# Patient Record
Sex: MALE | Race: WHITE
[De-identification: names, ages, dates, MRNs, and addresses within clinical notes are randomized per-mention and may not be internally consistent; named-entity substitution may affect disease eponyms.]

---

## 2021-04-05 ENCOUNTER — HOSPITAL ENCOUNTER (EMERGENCY)
Dept: HOSPITAL 75 - ER | Age: 66
Discharge: HOME | End: 2021-04-05
Payer: COMMERCIAL

## 2021-04-05 VITALS — WEIGHT: 296.96 LBS | HEIGHT: 78 IN | BODY MASS INDEX: 34.36 KG/M2

## 2021-04-05 VITALS — DIASTOLIC BLOOD PRESSURE: 76 MMHG | SYSTOLIC BLOOD PRESSURE: 124 MMHG

## 2021-04-05 DIAGNOSIS — M25.411: Primary | ICD-10-CM
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
## 2021-04-05 PROCEDURE — 99282 EMERGENCY DEPT VISIT SF MDM: CPT

## 2021-04-05 PROCEDURE — 73030 X-RAY EXAM OF SHOULDER: CPT

## 2021-04-05 NOTE — ED UPPER EXTREMITY
General


Chief Complaint:  Upper Extremity


Stated Complaint:  L SHOULDER INJ


Source:  patient


Exam Limitations:  no limitations





History of Present Illness


Date Seen by Provider:  2021


Time Seen by Provider:  20:03


Initial Comments


Mr. Escobar is a pleasant 65-year-old gentleman who just prior to arrival was 

playing Frisbee golf at [email or web address] and using his dominant right hand he did a under

conflict and immediately started having severe pain in his left shoulder just 

distal to the glenohumeral joint.  No prior injury there no fall and not on 

blood thinners.  He did not take anything for pain yet.  He rates it as a 7 out 

of 10 as long as he can split his left arm using his contralateral arm.





Allergies and Home Medications


Allergies


Coded Allergies:  


     No Known Drug Allergies (Unverified , 21)





Patient Home Medication List


Home Medication List Reviewed:  Yes





Review of Systems


Constitutional:  No chills, No diaphoresis


EENTM:  No ear discharge, No ear pain


Respiratory:  No cough, No short of breath


Cardiovascular:  No Hx of Intervention, No palpitations


Gastrointestinal:  No abdominal pain, No constipation, No diarrhea, No nausea


Genitourinary:  No discharge, No dysuria





All Other Systems Reviewed


Negative Unless Noted:  Yes





Past Medical-Social-Family Hx


Patient Social History


Alcohol Use:  Denies Use


Drug of Choice:  Denies





Physical Exam


Vital Signs





Vital Signs - First Documented








 21





 20:06


 


Temp 36.6


 


Pulse 86


 


Resp 18


 


B/P (MAP) 128/86 (100)


 


Pulse Ox 96


 


O2 Delivery Room Air





Capillary Refill :


Height, Weight, BMI


Height: '"


Weight: lbs. oz. kg;  BMI


Method:


General Appearance:  WD/WN, mild distress


HEENT:  PERRL/EOMI, pharynx normal


Neck:  non-tender, full range of motion, supple, normal inspection


Cardiovascular:  normal peripheral pulses, regular rate, rhythm, no edema


Respiratory:  no respiratory distress, no accessory muscle use


Shoulder:  bone tenderness (Proximal shaft just past the glenohumeral joint), 

limited ROM (Left), pain, swelling


Elbow/Forearm:  normal inspection, non-tender, no evidence of injury, normal 

ROM, Left


Wrist:  Yes normal inspection, Yes non-tender, Yes no evidence of injury, Yes 

normal ROM


Hand:  normal inspection, non-tender, no evidence of injury, normal ROM, Left


Neurologic/Psychiatric:  no motor/sensory deficits, alert, normal mood/affect, 

oriented x 3


Skin:  normal color, warm/dry





Procedures/Interventions





   Additional Procedures:  Arthrocentesis Aspirating


Progress


Discussed the risks, benefits and alternatives to aspirating his left shoulder 

and the patient elected to do it.  We used sterile gloves and sterile technique 

and Betadine.  After the Betadine had a few minutes to dry we then cleaned with 

chlorhexidine and then infiltrated the area with lidocaine.  Using a 18-gauge 

1/2 inch needle we put the needle into the joint space and were able to aspirate

just a small amount of serosanguineous secretions.  Unfortunately not enough to 

send the lab.  We infiltrated the area with 40 mg Depo-Medrol and half cc of 

Marcaine half percent strength.  Patient tolerated procedure well.  We will put 

a Band-Aid over the wound.





Progress/Results/Core Measures


Results/Orders


My Orders





Orders - HARDEEP COLEMAN


Shoulder, Left, 3 Views (21 20:07)


Methylprednisolone Acetate Inj (Depo-Med (21 21:00)


Lidocaine 1% Inj 20 Ml (Xylocaine 1% Inj (21 21:00)


Ketorolac Injection (Toradol Injection) (21 21:15)





Medications Given in ED





Current Medications








 Medications  Dose


 Ordered  Sig/Pb


 Route  Start Time


 Stop Time Status Last Admin


Dose Admin


 


 Lidocaine HCl  20 ml  ONCE  ONCE


 INJ  21 21:00


 21 21:01 DC 21 21:03


20 ML


 


 Methylprednisolone


 Acetate  40 mg  ONCE  ONCE


 IA  21 21:00


 21 21:01 DC 21 21:03


40 MG








Vital Signs/I&O











 21





 20:06


 


Temp 36.6


 


Pulse 86


 


Resp 18


 


B/P (MAP) 128/86 (100)


 


Pulse Ox 96


 


O2 Delivery Room Air











Progress


Progress Note :  


   Time:  20:09


Progress Note


Patient declined anything for pain.  We will get a plain film 3 view left 

shoulder x-ray





Diagnostic Imaging





   Diagonstic Imaging:  Xray


   Plain Films/CT/US/NM/MRI:  other (Left shoulder 3 views)


Comments


NAME:   YESENIA ESCOBAR


MED REC#:   [id number]


ACCOUNT#:   [id number]


PT STATUS:   REG ER


:   1955


PHYSICIAN:   HARDEEP COLEMAN MD


ADMIT DATE:   21/ER


                                   ***Draft***


Date of Exam:21





SHOULDER, LEFT, 3 VIEWS








INDICATION: Left shoulder pain.





COMPARISON: None available.





TECHNIQUE: 3 views of the left shoulder are obtained.





FINDINGS: No acute fracture or traumatic malalignment. There


appears to be some mineralization around the AC joint and


glenohumeral joint which could be due to cartilage calcification.


No osseous erosions. Joint spaces are fairly well-preserved.





IMPRESSION:


1. No acute osseous abnormality about the left shoulder.


2. Potential soft tissue mineralizations around the glenohumeral


and AC joints raise the possibility of CPPD arthropathy.





  Dictated on workstation # [id number]








Dict:   21


Trans:   21


[facility] [id number]





Interpreted by:     YESENIA GARY MD


Electronically signed by:


   Reviewed:  Reviewed by Me





Departure


Impression





   Primary Impression:  


   Left shoulder pain


   Qualified Codes:  M25.512 - Pain in left shoulder


Disposition:   HOME, SELF-CARE


Condition:  Stable





Departure-Patient Inst.


Decision time for Depature:  21:17


Referrals:  


ESTRELLITA RAVI MD (PCP/Family)


Primary Care Physician


Patient Instructions:  Passive Range of Motion Exercises, Neck and Shoulders, 

Shoulder Pain (DC)





Add. Discharge Instructions:  


We were not able to get any fluid out of the joint but the steroid should help 

even if it is a tendinitis of your left shoulder.  If not seeing improvement in 

1 week follow-up with your primary care doctor and discuss referral to 

orthopedic surgery, physical therapy etc.


Naproxen or ibuprofen as well as Tylenol for pain.


Ice 20 minutes on every 2 hours for the first 2 days as necessary for swelling 

and pain.


Keep the sling on for the next week until you are seen by your physician.  You 

may take the sling off to bathe.


It is reasonable also to take your arm out of the sling several times a day and 

do passive range of motion using your right arm.


Hydrocodone 1 tablet every 6 hours as necessary for severe breakthrough pain.


All discharge instructions reviewed with patient and/or family. Voiced 

understanding.


Scripts


Hydrocodone/Acetaminophen (Hydrocodone-Acetamin 5-325 mg) 1 Each Tablet


1 TAB PO Q6H PRN for PAIN-MODERATE (5-7), #12 TAB 0 Refills


   Prov: HARDEEP COLEMAN         21


Work/School Note:  Work Release Form   Date Seen in the Emergency Department:  

2021


   Return to Work:  2021


   Restrictions:  Need Release from Doctor


   Other Restrictions Listed Below:  Left arm in sling and no lifting, pushing 

or pulling until 2021.





Copy


Copies To 1:   ESTRELLITA RAVI MD, TITUS J                  2021 20:10

## 2021-04-05 NOTE — DIAGNOSTIC IMAGING REPORT
INDICATION: Left shoulder pain.



COMPARISON: None available.



TECHNIQUE: 3 views of the left shoulder are obtained.



FINDINGS: No acute fracture or traumatic malalignment. There

appears to be some mineralization around the AC joint and

glenohumeral joint which could be due to cartilage calcification.

No osseous erosions. Joint spaces are fairly well-preserved.



IMPRESSION:

1. No acute osseous abnormality about the left shoulder.

2. Potential soft tissue mineralizations around the glenohumeral

and AC joints raise the possibility of CPPD arthropathy.



Dictated by: 



  Dictated on workstation # [id number]

## 2022-05-06 ENCOUNTER — HOSPITAL ENCOUNTER (EMERGENCY)
Dept: HOSPITAL 75 - ER | Age: 67
Discharge: HOME | End: 2022-05-06
Payer: MEDICARE

## 2022-05-06 VITALS — BODY MASS INDEX: 34.13 KG/M2 | HEIGHT: 77.95 IN | WEIGHT: 294.98 LBS

## 2022-05-06 VITALS — SYSTOLIC BLOOD PRESSURE: 122 MMHG | DIASTOLIC BLOOD PRESSURE: 83 MMHG

## 2022-05-06 DIAGNOSIS — N45.3: Primary | ICD-10-CM

## 2022-05-06 LAB
APTT PPP: YELLOW S
BACTERIA #/AREA URNS HPF: (no result) /HPF
BASOPHILS # BLD AUTO: 0 10^3/UL (ref 0–0.1)
BASOPHILS NFR BLD AUTO: 0 % (ref 0–10)
BILIRUB UR QL STRIP: NEGATIVE
BUN/CREAT SERPL: 13
CALCIUM SERPL-MCNC: 9.4 MG/DL (ref 8.5–10.1)
CHLORIDE SERPL-SCNC: 103 MMOL/L (ref 98–107)
CO2 SERPL-SCNC: 22 MMOL/L (ref 21–32)
CREAT SERPL-MCNC: 1.21 MG/DL (ref 0.6–1.3)
EOSINOPHIL # BLD AUTO: 0.1 10^3/UL (ref 0–0.3)
EOSINOPHIL NFR BLD AUTO: 1 % (ref 0–10)
EOSINOPHIL NFR BLD MANUAL: 1 %
FIBRINOGEN PPP-MCNC: (no result) MG/DL
GFR SERPLBLD BASED ON 1.73 SQ M-ARVRAT: 66 ML/MIN
GLUCOSE SERPL-MCNC: 216 MG/DL (ref 70–105)
GLUCOSE UR STRIP-MCNC: NEGATIVE MG/DL
HCT VFR BLD CALC: 42 % (ref 40–54)
HGB BLD-MCNC: 13.5 G/DL (ref 13.3–17.7)
KETONES UR QL STRIP: NEGATIVE
LEUKOCYTE ESTERASE UR QL STRIP: (no result)
LYMPHOCYTES # BLD AUTO: 1.1 10^3/UL (ref 1–4)
LYMPHOCYTES NFR BLD AUTO: 7 % (ref 12–44)
MANUAL DIFFERENTIAL PERFORMED BLD QL: YES
MCH RBC QN AUTO: 29 PG (ref 25–34)
MCHC RBC AUTO-ENTMCNC: 33 G/DL (ref 32–36)
MCV RBC AUTO: 90 FL (ref 80–99)
MONOCYTES # BLD AUTO: 1 10^3/UL (ref 0–1)
MONOCYTES NFR BLD AUTO: 6 % (ref 0–12)
MONOCYTES NFR BLD: 8 %
NEUTROPHILS # BLD AUTO: 13.1 10^3/UL (ref 1.8–7.8)
NEUTROPHILS NFR BLD AUTO: 85 % (ref 42–75)
NEUTS BAND NFR BLD MANUAL: 89 %
NITRITE UR QL STRIP: NEGATIVE
PH UR STRIP: 5.5 [PH] (ref 5–9)
PLATELET # BLD: 216 10^3/UL (ref 130–400)
PMV BLD AUTO: 10.7 FL (ref 9–12.2)
POTASSIUM SERPL-SCNC: 4 MMOL/L (ref 3.6–5)
PROT UR QL STRIP: NEGATIVE
RBC #/AREA URNS HPF: (no result) /HPF
RBC MORPH BLD: NORMAL
SODIUM SERPL-SCNC: 140 MMOL/L (ref 135–145)
SP GR UR STRIP: 1.02 (ref 1.02–1.02)
SQUAMOUS #/AREA URNS HPF: (no result) /HPF
VARIANT LYMPHS NFR BLD MANUAL: 9 %
WBC # BLD AUTO: 15.3 10^3/UL (ref 4.3–11)

## 2022-05-06 PROCEDURE — 87186 SC STD MICRODIL/AGAR DIL: CPT

## 2022-05-06 PROCEDURE — 87088 URINE BACTERIA CULTURE: CPT

## 2022-05-06 PROCEDURE — 80048 BASIC METABOLIC PNL TOTAL CA: CPT

## 2022-05-06 PROCEDURE — 87077 CULTURE AEROBIC IDENTIFY: CPT

## 2022-05-06 PROCEDURE — 85007 BL SMEAR W/DIFF WBC COUNT: CPT

## 2022-05-06 PROCEDURE — 36415 COLL VENOUS BLD VENIPUNCTURE: CPT

## 2022-05-06 PROCEDURE — 76870 US EXAM SCROTUM: CPT

## 2022-05-06 PROCEDURE — 81000 URINALYSIS NONAUTO W/SCOPE: CPT

## 2022-05-06 PROCEDURE — 85027 COMPLETE CBC AUTOMATED: CPT

## 2022-05-06 NOTE — DIAGNOSTIC IMAGING REPORT
Clinical indications: Patient with red painful swollen right

testicle.



Exam: Ultrasound of the scrotum with multiple real-time grayscale

images were obtained in various projections. Additional spectral

analysis and color Doppler duplex images were also obtained. 



Comparison: None.



Findings:



Right testicle: 

Right testis measures 5.4 cm x 3.0 cm x 3.6 cm. The right testis

is homogeneous in echogenicity with no focal mass present.  There

is enlarged and hypervascular right epididymis region which may

be related to epididymitis. There is also slight increased

Doppler flow involving the right testicle in comparison to the

left testicle which may be related to orchitis. Otherwise right

testicle has normal spectral Doppler waveform. There is no

varicocele. Small right hydrocele is noted. There is no

testicular torsion.



Left testicle: 

Left testis measures 5.5 cm x 2.3 cm x 3.2 cm. Left testis is

homogeneous in echogenicity, with no focal mass present. Blood

flow is present in left testis on color flow imaging and has

normal appearing spectral Doppler waveform. Left epididymis is

unremarkable. There is no varicocele. There is no hydrocele.

There is no testicular torsion.



Impression:  

1: Right testicular findings are concerning for

orchitis-epididymitis. There is a small right hydrocele.

2: Left testicle is unremarkable. 



Dictated by: 



  Dictated on workstation # [id number]

## 2022-05-06 NOTE — ED GU-MALE
General


Chief Complaint:   - Reproductive


Stated Complaint:  R TESTICLE SWOLLEN


Nursing Triage Note:  


PRESENTS W/ C/O RIGHT TESTICULAR PAIN THAT STARTED YESTERDAY MORNING.  nOW 


HAVING INABILITY TO CONTROL URINE.  hAS URGENCY AND FREQUENCY.  tESTICULE NOW 


MORE SWOLLEN.


Source:  patient


Exam Limitations:  no limitations





History of Present Illness


Date Seen by Provider:  May 6, 2022


Time Seen by Provider:  19:57


Initial Comments


Patient presents ER by private conveyance with significant other chief complaint

yesterday morning approximately 36 hours ago patient started experiencing some 

right testicular swelling and pain which was mild to moderate at first and is 

now severe.  He takes Percocet for his back pain and he took a tablet that 

before coming in with no significant relief of pain.  He has dysuria which is 

worse in the morning and gets better throughout the day for the past 2 days.  No

history of  surgery or frequent UTIs.  No history of inguinal hernias





Allergies and Home Medications


Allergies


Coded Allergies:  


     No Known Drug Allergies (Unverified , 21)





Patient Home Medication List


Home Medication List Reviewed:  Yes


Hydrocodone/Acetaminophen (Hydrocodone-Acetamin 5-325 mg) 1 Each Tablet, 1 TAB 

PO Q6H PRN for PAIN-MODERATE (5-7)


   Prescribed by: HARDEEP COLEMAN on 21





Review of Systems


Review of Systems


Constitutional:  No chills, No fever, No malaise


EENTM:  No ear discharge, No ear pain


Respiratory:  No cough, No short of breath


Cardiovascular:  No chest pain, No edema


Gastrointestinal:  No abdominal pain, No nausea


Genitourinary:  denies discharge, denies dysuria


Musculoskeletal:  No back pain, No joint pain





All Other Systemes Reviewed


Negative Unless Noted:  Yes





Past Medical-Social-Family Hx


Patient Social History


Tobacco Use?:  No


Use of E-Cig and/or Vaping dev:  No


Substance use?:  Yes


Substance type:  Marijuana


Alcohol Use?:  No





Seasonal Allergies


Seasonal Allergies:  No





Past Medical History


Surgeries:  Yes


Orthopedic


Respiratory:  No


Cardiac:  No


Neurological:  No


Genitourinary:  No


Gastrointestinal:  No


Musculoskeletal:  Yes (ROTATOR CUFF REPLAIR & AC JOINT. BILAT KNEE REPLACEMENT )


Endocrine:  Yes


Diabetes, Non-Insulin dep


HEENT:  No


Cancer:  No


Psychosocial:  No


Integumentary:  No





Physical Exam


Vital Signs





Vital Signs - First Documented








 22





 19:48


 


Temp 36.7


 


Pulse 112


 


Resp 22


 


B/P (MAP) 122/83 (96)


 


Pulse Ox 98


 


O2 Delivery Room Air





Capillary Refill : Less Than 3 Seconds


Height, Weight, BMI


Height: '"


Weight: lbs. oz. kg; 34.00 BMI


Method:


General Appearance:  WD/WN, mild distress


HEENT:  PERRL/EOMI, pharynx normal


Neck:  full range of motion, normal inspection


Cardiovascular:  normal peripheral pulses, regular rate, rhythm


Respiratory:  lungs clear, normal breath sounds, no respiratory distress, no 

accessory muscle use


Gastrointestinal:  normal bowel sounds, non tender, soft


Genital/Rectal:  other (No lesions on the scrotum or penis.  No exudate or 

drainage from the urethral meatus.  No masses palpable in the penis.  The 

scrotum contains an erythematous skin on the right side with a testicle on the 

right that is approximately 3-4 times the size of his left testicle and is 

tender to palpation without hard, firm mass.  Inguinal canals free of any 

abdominal content)


Extremities:  non-tender, normal inspection


Neurologic/Psychiatric:  alert, normal mood/affect, oriented x 3





Progress/Results/Core Measures


Suspected Sepsis


SIRS


Temperature: 


Pulse: 112 


Respiratory Rate: 22


 


Laboratory Tests


22 21:20: White Blood Count 15.3H


Blood Pressure 122 /83 


Mean: 96


 











Laboratory Tests


22 21:20: 


Creatinine 1.21, Platelet Count 216





Results/Orders


Lab Results





Laboratory Tests








Test


 22


20:52 22


21:20 Range/Units


 


 


Urine Color YELLOW    


 


Urine Clarity SL CLOUDY    


 


Urine pH 5.5   5-9  


 


Urine Specific Gravity 1.025 H  1.016-1.022  


 


Urine Protein NEGATIVE   NEGATIVE  


 


Urine Glucose (UA) NEGATIVE   NEGATIVE  


 


Urine Ketones NEGATIVE   NEGATIVE  


 


Urine Nitrite NEGATIVE   NEGATIVE  


 


Urine Bilirubin NEGATIVE   NEGATIVE  


 


Urine Urobilinogen 0.2   < = 1.0  MG/DL


 


Urine Leukocyte Esterase 2+ H  NEGATIVE  


 


Urine RBC (Auto) TRACE-I H  NEGATIVE  


 


Urine RBC 5-10 H   /HPF


 


Urine WBC 10-25 H   /HPF


 


Urine Squamous Epithelial


Cells 0-2 


 


  /HPF





 


Urine Crystals NONE    /LPF


 


Urine Bacteria FEW H   /HPF


 


Urine Casts NONE    /LPF


 


Urine Mucus SMALL H   /LPF


 


Urine Culture Indicated YES    


 


White Blood Count


 


 15.3 H


 4.3-11.0


10^3/uL


 


Red Blood Count


 


 4.61 


 4.30-5.52


10^6/uL


 


Hemoglobin  13.5  13.3-17.7  g/dL


 


Hematocrit  42  40-54  %


 


Mean Corpuscular Volume  90  80-99  fL


 


Mean Corpuscular Hemoglobin  29  25-34  pg


 


Mean Corpuscular Hemoglobin


Concent 


 33 


 32-36  g/dL





 


Red Cell Distribution Width  13.7  10.0-14.5  %


 


Platelet Count


 


 216 


 130-400


10^3/uL


 


Mean Platelet Volume  10.7  9.0-12.2  fL


 


Immature Granulocyte % (Auto)  0   %


 


Neutrophils (%) (Auto)  85 H 42-75  %


 


Lymphocytes (%) (Auto)  7 L 12-44  %


 


Monocytes (%) (Auto)  6  0-12  %


 


Eosinophils (%) (Auto)  1  0-10  %


 


Basophils (%) (Auto)  0  0-10  %


 


Neutrophils # (Auto)


 


 13.1 H


 1.8-7.8


10^3/uL


 


Lymphocytes # (Auto)


 


 1.1 


 1.0-4.0


10^3/uL


 


Monocytes # (Auto)


 


 1.0 


 0.0-1.0


10^3/uL


 


Eosinophils # (Auto)


 


 0.1 


 0.0-0.3


10^3/uL


 


Basophils # (Auto)


 


 0.0 


 0.0-0.1


10^3/uL


 


Immature Granulocyte # (Auto)


 


 0.1 


 0.0-0.1


10^3/uL


 


Neutrophils % (Manual)  89   %


 


Lymphocytes % (Manual)  9   %


 


Monocytes % (Manual)  8   %


 


Eosinophils % (Manual)  1   %


 


Blood Morphology Comment  NORMAL   


 


Sodium Level  140  135-145  MMOL/L


 


Potassium Level  4.0  3.6-5.0  MMOL/L


 


Chloride Level  103    MMOL/L


 


Carbon Dioxide Level  22  21-32  MMOL/L


 


Anion Gap  15 H 5-14  MMOL/L


 


Blood Urea Nitrogen  16  7-18  MG/DL


 


Creatinine


 


 1.21 


 0.60-1.30


MG/DL


 


Estimat Glomerular Filtration


Rate 


 66 


  





 


BUN/Creatinine Ratio  13   


 


Glucose Level  216 H   MG/DL


 


Calcium Level  9.4  8.5-10.1  MG/DL








My Orders





Orders - HARDEEP COLEMAN


Fentanyl  Inj (Sublimaze Injection) (22 20:15)


Ua Culture If Indicated (22 20:09)


Cbc With Automated Diff (22 20:09)


Basic Metabolic Panel (22 20:09)


Us Scrotum (Testicle) [id number] (22 20:14)


Ed Iv/Invasive Line Start (22 20:14)


Ceftriaxone 1 Gm Pre-Mix (Rocephin 1 Gm (22 21:00)


Urine Culture (22 20:52)


Manual Differential (22 21:20)





Medications Given in ED





Current Medications








 Medications  Dose


 Ordered  Sig/Pb


 Route  Start Time


 Stop Time Status Last Admin


Dose Admin


 


 Ceftriaxone


 Sodium/Dextrose  50 ml @ 


 100 mls/hr  ONCE  ONCE


 IV  22 21:00


 22 21:29 DC 22 21:27


100 MLS/HR


 


 Fentanyl Citrate  75 mcg  ONCE  ONCE


 IV  22 20:15


 22 20:16 DC 22 21:24


75 MCG








Vital Signs/I&O











 22





 19:48


 


Temp 36.7


 


Pulse 112


 


Resp 22


 


B/P (MAP) 122/83 (96)


 


Pulse Ox 98


 


O2 Delivery Room Air





Capillary Refill : Less Than 3 Seconds








Blood Pressure Mean:                    96








Progress Note :  


   Time:  20:13


Progress Note


Suspect a UTI that is turned into an orchitis/epididymitis.  Pains been going on

for significant amount of time so he given some fentanyl and have asked 

ultrasound to come in which they have agreed to scan him.  Basic labs.





Diagnostic Imaging





   Diagonstic Imaging:  Ultrasound


   Plain Films/CT/US/NM/MRI:  other (Scrotum)


Comments


Orchitis and epididymitis of the right testicle with a small amount of 

physiologic fluid seen.  No masses.  Good blood flow.  No torsion.


                 [facility] [place], [place]





NAME:   YESENIA BHAKTA


[facility] REC#:   [id number]


ACCOUNT#:   [id number]


PT STATUS:   REG ER


:   1955


PHYSICIAN:   HARDEEP COLEMAN MD


ADMIT DATE:   22/ER


                                   ***Draft***


Date of Exam:22





US SCROTUM (Testicle) [id number]








Clinical indications: Patient with red painful swollen right


testicle.





Exam: Ultrasound of the scrotum with multiple real-time grayscale


images were obtained in various projections. Additional spectral


analysis and color Doppler duplex images were also obtained. 





Comparison: None.





Findings:





Right testicle: 


Right testis measures 5.4 cm x 3.0 cm x 3.6 cm. The right testis


is homogeneous in echogenicity with no focal mass present.  There


is enlarged and hypervascular right epididymis region which may


be related to epididymitis. There is also slight increased


Doppler flow involving the right testicle in comparison to the


left testicle which may be related to orchitis. Otherwise right


testicle has normal spectral Doppler waveform. There is no


varicocele. Small right hydrocele is noted. There is no


testicular torsion.





Left testicle: 


Left testis measures 5.5 cm x 2.3 cm x 3.2 cm. Left testis is


homogeneous in echogenicity, with no focal mass present. Blood


flow is present in left testis on color flow imaging and has


normal appearing spectral Doppler waveform. Left epididymis is


unremarkable. There is no varicocele. There is no hydrocele.


There is no testicular torsion.





Impression:  


1: Right testicular findings are concerning for


orchitis-epididymitis. There is a small right hydrocele.


2: Left testicle is unremarkable. 





  Dictated on workstation # [id number]








Dict:   22


Trans:   22


[facility] [id number]





Interpreted by:     KRZYSZTOF ALEXANDRA MD


Electronically signed by:


   Reviewed:  Reviewed by Me





Departure


Impression





   Primary Impression:  


   Orchitis and epididymitis


Disposition:  01 HOME, SELF-CARE


Condition:  Stable





Departure-Patient Inst.


Decision time for Depature:  22:11


Referrals:  


ESTRELLITA RAVI MD (PCP/Family)


Primary Care Physician








TAWIL,ELIAS A MD


Patient Instructions:  Epididymitis (DC)





Add. Discharge Instructions:  


This is an infection of the testicle and surrounding tissue.  It is typically 

easily treated with antibiotics.


Doxycycline 1 capsule twice a day for the next 10 days.  You can start this 

tomorrow, 2022.


Percocet 1 to 2 tablets every 4 hours as needed for pain.


You might want to take something to keep your bowels regular such as MiraLAX, 

Colace etc.


Pain should start improving in 3 to 4 days of antibiotics.


Follow-up with the urologist, Dr. Tawil for help with managing your symptoms.





All discharge instructions reviewed with patient and/or family. Voiced u

nderstanding.


Scripts


Ondansetron (Ondansetron Odt) 4 Mg Tab.rapdis


4 MG PO Q6H PRN for NAUSEA/VOMITING, #12 TAB 0 Refills


   Prov: HARDEEP COLEMAN         22 


Doxycycline Hyclate (Doxycycline Hyclate) 100 Mg Tablet


100 MG PO BID for 10 Days, #20 TAB 0 Refills


   Prov: HARDEEP COLEMAN         22 


Oxycodone HCl/Acetaminophen (Percocet 5-325 mg Tablet) 5 Mg-325 Mg Tablet


1-2 TAB PO Q4H PRN for PAIN-MODERATE MDD 6 for 5 Days, #30 TAB 0 Refills


   Prov: HARDEEP COLEMAN         22





Copy


Copies To 1:   TAWIL,ELIAS A MD WELLER,TITUS J                  May 6, 2022 20:14

## 2022-05-09 ENCOUNTER — HOSPITAL ENCOUNTER (INPATIENT)
Dept: HOSPITAL 75 - ER | Age: 67
LOS: 2 days | Discharge: HOME | DRG: 728 | End: 2022-05-11
Attending: FAMILY MEDICINE | Admitting: FAMILY MEDICINE
Payer: MEDICARE

## 2022-05-09 VITALS — HEIGHT: 77.17 IN | BODY MASS INDEX: 34.93 KG/M2 | WEIGHT: 295.86 LBS

## 2022-05-09 VITALS — DIASTOLIC BLOOD PRESSURE: 82 MMHG | SYSTOLIC BLOOD PRESSURE: 133 MMHG

## 2022-05-09 VITALS — SYSTOLIC BLOOD PRESSURE: 148 MMHG | DIASTOLIC BLOOD PRESSURE: 94 MMHG

## 2022-05-09 DIAGNOSIS — M54.9: ICD-10-CM

## 2022-05-09 DIAGNOSIS — E66.9: ICD-10-CM

## 2022-05-09 DIAGNOSIS — E11.9: ICD-10-CM

## 2022-05-09 DIAGNOSIS — G89.29: ICD-10-CM

## 2022-05-09 DIAGNOSIS — N45.3: Primary | ICD-10-CM

## 2022-05-09 LAB
ALBUMIN SERPL-MCNC: 4 GM/DL (ref 3.2–4.5)
ALP SERPL-CCNC: 71 U/L (ref 40–136)
ALT SERPL-CCNC: 36 U/L (ref 0–55)
APTT BLD: 37 SEC (ref 24–35)
APTT PPP: YELLOW S
BACTERIA #/AREA URNS HPF: NEGATIVE /HPF
BASOPHILS # BLD AUTO: 0 10^3/UL (ref 0–0.1)
BASOPHILS NFR BLD AUTO: 0 % (ref 0–10)
BILIRUB SERPL-MCNC: 1.7 MG/DL (ref 0.1–1)
BILIRUB UR QL STRIP: NEGATIVE
BUN/CREAT SERPL: 13
CALCIUM SERPL-MCNC: 10 MG/DL (ref 8.5–10.1)
CHLORIDE SERPL-SCNC: 102 MMOL/L (ref 98–107)
CO2 SERPL-SCNC: 21 MMOL/L (ref 21–32)
CREAT SERPL-MCNC: 0.91 MG/DL (ref 0.6–1.3)
EOSINOPHIL # BLD AUTO: 0.3 10^3/UL (ref 0–0.3)
EOSINOPHIL NFR BLD AUTO: 3 % (ref 0–10)
FIBRINOGEN PPP-MCNC: (no result) MG/DL
GFR SERPLBLD BASED ON 1.73 SQ M-ARVRAT: 94 ML/MIN
GLUCOSE SERPL-MCNC: 162 MG/DL (ref 70–105)
GLUCOSE UR STRIP-MCNC: NEGATIVE MG/DL
HCT VFR BLD CALC: 41 % (ref 40–54)
HGB BLD-MCNC: 13.6 G/DL (ref 13.3–17.7)
INR PPP: 1.1 (ref 0.8–1.4)
KETONES UR QL STRIP: (no result)
LEUKOCYTE ESTERASE UR QL STRIP: (no result)
LYMPHOCYTES # BLD AUTO: 1.9 X 10^3 (ref 1–4)
LYMPHOCYTES NFR BLD AUTO: 15 % (ref 12–44)
MANUAL DIFFERENTIAL PERFORMED BLD QL: NO
MCH RBC QN AUTO: 29 PG (ref 25–34)
MCHC RBC AUTO-ENTMCNC: 33 G/DL (ref 32–36)
MCV RBC AUTO: 89 FL (ref 80–99)
MONOCYTES # BLD AUTO: 0.9 X 10^3 (ref 0–1)
MONOCYTES NFR BLD AUTO: 8 % (ref 0–12)
NEUTROPHILS # BLD AUTO: 9.1 X 10^3 (ref 1.8–7.8)
NEUTROPHILS NFR BLD AUTO: 74 % (ref 42–75)
NITRITE UR QL STRIP: NEGATIVE
PH UR STRIP: 5.5 [PH] (ref 5–9)
PLATELET # BLD: 289 10^3/UL (ref 130–400)
PMV BLD AUTO: 10.8 FL (ref 9–12.2)
POTASSIUM SERPL-SCNC: 4.1 MMOL/L (ref 3.6–5)
PROT SERPL-MCNC: 8.2 GM/DL (ref 6.4–8.2)
PROT UR QL STRIP: NEGATIVE
PROTHROMBIN TIME: 14.7 SEC (ref 12.2–14.7)
RBC #/AREA URNS HPF: (no result) /HPF
SODIUM SERPL-SCNC: 138 MMOL/L (ref 135–145)
SP GR UR STRIP: 1.02 (ref 1.02–1.02)
SQUAMOUS #/AREA URNS HPF: (no result) /HPF
WBC # BLD AUTO: 12.2 10^3/UL (ref 4.3–11)

## 2022-05-09 PROCEDURE — 96365 THER/PROPH/DIAG IV INF INIT: CPT

## 2022-05-09 PROCEDURE — 96375 TX/PRO/DX INJ NEW DRUG ADDON: CPT

## 2022-05-09 PROCEDURE — 80048 BASIC METABOLIC PNL TOTAL CA: CPT

## 2022-05-09 PROCEDURE — 76705 ECHO EXAM OF ABDOMEN: CPT

## 2022-05-09 PROCEDURE — 87591 N.GONORRHOEAE DNA AMP PROB: CPT

## 2022-05-09 PROCEDURE — 85610 PROTHROMBIN TIME: CPT

## 2022-05-09 PROCEDURE — 87040 BLOOD CULTURE FOR BACTERIA: CPT

## 2022-05-09 PROCEDURE — 96361 HYDRATE IV INFUSION ADD-ON: CPT

## 2022-05-09 PROCEDURE — 81000 URINALYSIS NONAUTO W/SCOPE: CPT

## 2022-05-09 PROCEDURE — 80053 COMPREHEN METABOLIC PANEL: CPT

## 2022-05-09 PROCEDURE — 87088 URINE BACTERIA CULTURE: CPT

## 2022-05-09 PROCEDURE — 85025 COMPLETE CBC W/AUTO DIFF WBC: CPT

## 2022-05-09 PROCEDURE — 83036 HEMOGLOBIN GLYCOSYLATED A1C: CPT

## 2022-05-09 PROCEDURE — 76870 US EXAM SCROTUM: CPT

## 2022-05-09 PROCEDURE — 87491 CHLMYD TRACH DNA AMP PROBE: CPT

## 2022-05-09 PROCEDURE — 85730 THROMBOPLASTIN TIME PARTIAL: CPT

## 2022-05-09 PROCEDURE — 86780 TREPONEMA PALLIDUM: CPT

## 2022-05-09 PROCEDURE — 80202 ASSAY OF VANCOMYCIN: CPT

## 2022-05-09 PROCEDURE — 83605 ASSAY OF LACTIC ACID: CPT

## 2022-05-09 PROCEDURE — 36415 COLL VENOUS BLD VENIPUNCTURE: CPT

## 2022-05-09 RX ADMIN — DOCUSATE SODIUM SCH MG: 100 CAPSULE ORAL at 21:25

## 2022-05-09 RX ADMIN — SODIUM CHLORIDE SCH MLS/HR: 900 INJECTION, SOLUTION INTRAVENOUS at 17:54

## 2022-05-09 RX ADMIN — SODIUM CHLORIDE SCH MLS/HR: 900 INJECTION INTRAVENOUS at 22:31

## 2022-05-09 RX ADMIN — ENOXAPARIN SODIUM SCH MG: 100 INJECTION SUBCUTANEOUS at 18:06

## 2022-05-09 RX ADMIN — POLYETHYLENE GLYCOL (3350) SCH GM: 17 POWDER, FOR SOLUTION ORAL at 18:48

## 2022-05-09 NOTE — PROGRESS NOTE
Subjective


Subjective/Events-last exam


66 yo male with a past medical hx of diabetes and recent spinal surgery 

presented for right testicular swelling. Pt reports a week and a half ago he had

burning with urination in the morning that improve throughout the day. Then on 

Friday he noticed right testicular swelling and pain. Pt states he came to the E

D and was given Rocephin and Doxycycline which he was compliant with taking. Pt 

was also prescribed opioid pain medication for a recent spinal surgery that he 

was taking for the pain. Pt reports the testicular swelling and pain worsened so

he reported back to the ED today. Pt states he has had some nausea and 

constipation since starting the opioids. Pt reports having a fever of 101 

yesterday, but this morning it was 96. Pt denies SOA, chest pain, palpitations, 

dysuria, urinary frequency, back pain or LE edema.


Review of Systems


General:  No Chills, No Fatigue


HEENT:  No Head Aches, No Visual Changes


Pulmonary:  No Dyspnea; Cough


Cardiovascular:  No: Chest Pain, Palpitations


Gastrointestinal:  Nausea, Constipation; No: Vomiting, Diarrhea


Genitourinary:  No Dysuria


Musculoskeletal:  No: neck pain, shoulder pain, back pain


Neurological:  No: Weakness, Numbness





Focused Exam


Lactate Level


5/9/22 15:22: Lactic Acid Level 1.77


Lactic Acid Level





Laboratory Tests








Test


 5/9/22


15:22


 


Lactic Acid Level


 1.77 MMOL/L


(0.50-2.00)











Objective


Exam


Last Set of Vital Signs





Vital Signs








  Date Time  Temp Pulse Resp B/P (MAP) Pulse Ox O2 Delivery O2 Flow Rate FiO2


 


5/9/22 15:00 36.8 89 20 127/77 (94)    





Capillary Refill :


General:  Alert, Oriented X3, Cooperative


HEENT:  Atraumatic, PERRLA


Lungs:  Clear to Auscultation


Heart:  Regular Rate, Normal S1, Normal S2


Abdomen:  Normal Bowel Sounds, Soft


Extremities:  No Clubbing, No Cyanosis, No Edema


Skin:  Other (erythema and edema of right testicle)


Neuro:  Normal Speech, Sensation Intact


Psych/Mental Status:  Mental Status NL, Mood NL





Results/Procedures


Lab


Laboratory Tests


5/9/22 15:00: 


White Blood Count 12.2H, Red Blood Count 4.63, Hemoglobin 13.6, Hematocrit 41, 

Mean Corpuscular Volume 89, Mean Corpuscular Hemoglobin 29, Mean Corpuscular 

Hemoglobin Concent 33, Red Cell Distribution Width 13.5, Platelet Count 289, 

Mean Platelet Volume 10.8, Immature Granulocyte % (Auto) 0, Neutrophils (%) 

(Auto) 74, Lymphocytes (%) (Auto) 15, Monocytes (%) (Auto) 8, Eosinophils (%) 

(Auto) 3, Basophils (%) (Auto) 0, Neutrophils # (Auto) 9.1H, Lymphocytes # 

(Auto) 1.9, Monocytes # (Auto) 0.9, Eosinophils # (Auto) 0.3, Basophils # (Auto)

0.0, Immature Granulocyte # (Auto) 0.0, Prothrombin Time 14.7, INR Comment 1.1, 

Activated Partial Thromboplast Time 37H, Sodium Level 138, Potassium Level 4.1, 

Chloride Level 102, Carbon Dioxide Level 21, Anion Gap 15H, Blood Urea Nitrogen 

12, Creatinine 0.91, Estimat Glomerular Filtration Rate 94, BUN/Creatinine Ratio

13, Glucose Level 162H, Calcium Level 10.0, Corrected Calcium 10.0, Total 

Bilirubin 1.7H, Aspartate Amino Transf (AST/SGOT) 42H, Alanine Aminotransferase 

(ALT/SGPT) 36, Alkaline Phosphatase 71, Total Protein 8.2, Albumin 4.0


5/9/22 15:22: Lactic Acid Level 1.77





Assessment/Plan


Assessment/Plan


Admission Dx


Right orchitis


Assessment & Plan


Right orchitis


   -Failed outpatient antibiotic treatment with Rocephin and doxycycline


   -Urology consulted


   -Scrotal U/S pending


   -Cefepime and Vanc started


   -Dilaudid for pain management


   -Chlamydia and Gonorrhea ordered


Diabetes


   -SSI


Recent hx of spinal surgery with opioid use





DVT prophylaxis with SCDs


Diet- Regular











TOBI GAMBINO MED STUDENT     May 9, 2022 17:08

## 2022-05-09 NOTE — DIAGNOSTIC IMAGING REPORT
PROCEDURE: US Scrotum.



TECHNIQUE: Multiple real-time grayscale images were obtained over

the scrotum in various projections bilaterally.



INDICATION: Swelling, pain.



COMPARISON: May 06, 2022.



FINDINGS: The right testicle is mildly enlarged measuring 5.5 x

3.8 x 2.9 cm. It maintains a homogeneous echotexture without

intratesticular mass. Vascular flow throughout the right testicle

has increased throughout.



The right epididymis is prominent with diffusely increased

vascularity throughout.



Small right-sided hydrocele.



The left testicle measures 3.9 x 2.4 x 1.8 cm. It maintains a

homogeneous echotexture without discrete intratesticular mass.

Vascular flow is noted within the left testicle.



Tiny anechoic cyst is noted within the left epididymal head.

Otherwise, the left epididymis is unremarkable.



No significant left-sided hydrocele.



IMPRESSION: Findings consistent with right-sided

epididymo-orchitis.



Small right-sided hydrocele.



Small left epididymal head cyst.



Dictated by: 



  Dictated on workstation # [id number]

## 2022-05-09 NOTE — ED GU-MALE
General


Chief Complaint:   - Reproductive


Stated Complaint:  R TESTICLE SWOLLEN


Source:  patient, spouse


Exam Limitations:  no limitations





History of Present Illness


Date Seen by Provider:  May 9, 2022


Time Seen by Provider:  14:55


Initial Comments


Patient to the ER by private conveyance from home with his spouse and chief 

complaint of swollen, painful right testicle.  He was seen Friday night, 3 days 

ago for the same symptoms.  He was diagnosed with orchitis, given Rocephin and 

put out on doxycycline which he states he has been taking.  He typically takes 

Percocet tens 2-3 times a day for his chronic back pain and was sent home with 

more Percocets as well as nausea medicine.  He says his nausea starting to come 

back now and he rates his pain as a 10 out of 10.  He had some subjective fevers

and chills last night which were new.  He feels like the swelling has 

significantly gotten worse rather than better and called the urologist today who

told him to come to the ER to be checked out.





Allergies and Home Medications


Allergies


Coded Allergies:  


     morphine (Verified  Allergy, Unknown, HIVES, 22)





Patient Home Medication List


Home Medication List Reviewed:  Yes


Doxycycline Hyclate (Doxycycline Hyclate) 100 Mg Tablet, 100 MG PO BID


   Prescribed by: HARDEEP COLEMAN on 22


Hydrocodone/Acetaminophen (Hydrocodone-Acetamin 5-325 mg) 1 Each Tablet, 1 TAB 

PO Q6H PRN for PAIN-MODERATE (5-7)


   Prescribed by: HARDEEP COLEMAN on 21


Ondansetron (Ondansetron Odt) 4 Mg Tab.rapdis, 4 MG PO Q6H PRN for 

NAUSEA/VOMITING


   Prescribed by: HARDEEP COLEMAN on 22


Oxycodone HCl/Acetaminophen (Percocet 5-325 mg Tablet) 5 Mg-325 Mg Tablet, 1-2 

TAB PO Q4H PRN for PAIN-MODERATE


   Prescribed by: HARDEEP COLEMAN on 22





Review of Systems


Review of Systems


Constitutional:  No chills, No diaphoresis


EENTM:  No ear discharge, No ear pain


Respiratory:  No cough, No short of breath


Cardiovascular:  No chest pain, No edema


Gastrointestinal:  No abdominal pain, No nausea, No vomiting


Genitourinary:  denies discharge, denies dysuria


Musculoskeletal:  No back pain, No joint pain





All Other Systemes Reviewed


Negative Unless Noted:  Yes





Past Medical-Social-Family Hx


Patient Social History


Tobacco Use?:  No


Use of E-Cig and/or Vaping dev:  No


Substance use?:  Yes


Substance type:  Marijuana


Substance frequency:  Several times a month


Alcohol Use?:  No


Pt feels they are or have been:  No





Seasonal Allergies


Seasonal Allergies:  No





Past Medical History


Surgery/Hospitalization HX:  


DM


Surgeries:  Yes


Orthopedic


Respiratory:  No


Cardiac:  No


Neurological:  No


Genitourinary:  No


Gastrointestinal:  No


Musculoskeletal:  Yes (ROTATOR CUFF REPLAIR & AC JOINT. BILAT KNEE REPLACEMENT )


Endocrine:  Yes


Diabetes, Non-Insulin dep


HEENT:  No


Cancer:  No


Psychosocial:  No


Integumentary:  No





Physical Exam


Vital Signs





Vital Signs - First Documented








 22





 15:00


 


Temp 36.8


 


Pulse 89


 


Resp 20


 


B/P (MAP) 127/77 (94)





Capillary Refill :


Height, Weight, BMI


Height: '"


Weight: lbs. oz. kg; 34.00 BMI


Method:


General Appearance:  WD/WN, mild distress


HEENT:  PERRL/EOMI, pharynx normal


Neck:  full range of motion, normal inspection


Cardiovascular:  normal peripheral pulses, regular rate, rhythm


Respiratory:  no respiratory distress, no accessory muscle use


Gastrointestinal:  non tender, soft


Genital/Rectal:  other (Penis unremarkable without lesion or discharge.  Scrotum

on the right side swollen more than left with large, tender right testis 

approximately 7 cm long at greatest diameter.)


Extremities:  normal range of motion, normal capillary refill


Neurologic/Psychiatric:  alert, normal mood/affect, oriented x 3


Skin:  ecchymosis (Right scrotum and perineal area without induration)





Focused Exam


Lactate Level


22 15:22: Lactic Acid Level 1.77





Lactic Acid Level





Laboratory Tests








Test


 22


15:22


 


Lactic Acid Level


 1.77 MMOL/L


(0.50-2.00)











Progress/Results/Core Measures


Suspected Sepsis


SIRS


Temperature: 


Pulse:  


Respiratory Rate: 


 


Laboratory Tests


22 15:00: White Blood Count 12.2H


Blood Pressure  / 


Mean: 


 





22 15:22: Lactic Acid Level 1.77


Laboratory Tests


22 15:00: 


Creatinine 0.91, INR Comment 1.1, Platelet Count 289, Total Bilirubin 1.7H








Results/Orders


Lab Results





Laboratory Tests








Test


 22


15:00 22


15:22 Range/Units


 


 


White Blood Count


 12.2 H


 


 4.3-11.0


10^3/uL


 


Red Blood Count


 4.63 


 


 4.30-5.52


10^6/uL


 


Hemoglobin 13.6   13.3-17.7  g/dL


 


Hematocrit 41   40-54  %


 


Mean Corpuscular Volume 89   80-99  fL


 


Mean Corpuscular Hemoglobin 29   25-34  pg


 


Mean Corpuscular Hemoglobin


Concent 33 


 


 32-36  g/dL





 


Red Cell Distribution Width 13.5   10.0-14.5  %


 


Platelet Count


 289 


 


 130-400


10^3/uL


 


Mean Platelet Volume 10.8   9.0-12.2  fL


 


Immature Granulocyte % (Auto) 0    %


 


Neutrophils (%) (Auto) 74   42-75  %


 


Lymphocytes (%) (Auto) 15   12-44  %


 


Monocytes (%) (Auto) 8   0-12  %


 


Eosinophils (%) (Auto) 3   0-10  %


 


Basophils (%) (Auto) 0   0-10  %


 


Neutrophils # (Auto) 9.1 H  1.8-7.8  X 10^3


 


Lymphocytes # (Auto) 1.9   1.0-4.0  X 10^3


 


Monocytes # (Auto) 0.9   0.0-1.0  X 10^3


 


Eosinophils # (Auto)


 0.3 


 


 0.0-0.3


10^3/uL


 


Basophils # (Auto)


 0.0 


 


 0.0-0.1


10^3/uL


 


Immature Granulocyte # (Auto)


 0.0 


 


 0.0-0.1


10^3/uL


 


Prothrombin Time 14.7   12.2-14.7  SEC


 


INR Comment 1.1   0.8-1.4  


 


Activated Partial


Thromboplast Time 37 H


 


 24-35  SEC





 


Sodium Level 138   135-145  MMOL/L


 


Potassium Level 4.1   3.6-5.0  MMOL/L


 


Chloride Level 102     MMOL/L


 


Carbon Dioxide Level 21   21-32  MMOL/L


 


Anion Gap 15 H  5-14  MMOL/L


 


Blood Urea Nitrogen 12   7-18  MG/DL


 


Creatinine


 0.91 


 


 0.60-1.30


MG/DL


 


Estimat Glomerular Filtration


Rate 94 


 


  





 


BUN/Creatinine Ratio 13    


 


Glucose Level 162 H    MG/DL


 


Calcium Level 10.0   8.5-10.1  MG/DL


 


Corrected Calcium 10.0   8.5-10.1  MG/DL


 


Total Bilirubin 1.7 H  0.1-1.0  MG/DL


 


Aspartate Amino Transf


(AST/SGOT) 42 H


 


 5-34  U/L





 


Alanine Aminotransferase


(ALT/SGPT) 36 


 


 0-55  U/L





 


Alkaline Phosphatase 71     U/L


 


Total Protein 8.2   6.4-8.2  GM/DL


 


Albumin 4.0   3.2-4.5  GM/DL


 


Lactic Acid Level


 


 1.77 


 0.50-2.00


MMOL/L








My Orders





Orders - HARDEEP COLEMAN


Cbc With Automated Diff (22 15:08)


Comprehensive Metabolic Panel (22 15:08)


Blood Culture (22 15:08)


Sputum Culture (22 15:08)


Urinalysis (22 15:08)


Urine Culture (22 15:08)


Protime With Inr (22 15:08)


Partial Thromboplastin Time (22 15:08)


Ed Iv/Invasive Line Start (22 15:08)


Ed Iv/Invasive Line Start (22 15:08)


Vital Signs Adult Sepsis Patie Q15M (22 15:08)


Remove Rings In Anticipation O (22 15:08)


Lactic Acid Analyzer (22 15:08)


Ns Iv 1000 Ml (Sodium Chloride 0.9%) (22 15:15)


Cefepime Injection (Maxipime Injection) (22 15:15)


Vancomycin Injection (Vancomycin Injecti (22 15:08)


Ed Iv/Invasive Line Start (22 15:08)


Us Scrotum (Testicle) [id number] (22 15:12)


Ed Iv/Invasive Line Start (22 15:12)


Ns Iv 1000 Ml (Sodium Chloride 0.9%) (22 15:15)


Ondansetron Injection (Zofran Injectio (22 15:15)


Hydromorphone Injection (Dilaudid Inject (22 15:15)


Neis Damian Dna Urine Test (5/9/22 16:20)


Chlamydia Trachomatis Urine (22 16:20)


Syphilis Antibody Screen (22 16:20)


Ed Admission (Communication) (22 16:20)





Medications Given in ED





Current Medications








 Medications  Dose


 Ordered  Sig/Pb


 Route  Start Time


 Stop Time Status Last Admin


Dose Admin


 


 Cefepime HCl 1000


 mg/Sodium Chloride  50 ml @ 


 100 mls/hr  ONCE  ONCE


 IV  22 15:15


 22 15:44 DC 22 16:39


100 MLS/HR


 


 Hydromorphone HCl  0.5 mg  ONCE  ONCE


 IV  22 15:15


 22 15:16 DC 22 15:20


0.5 MG


 


 Ondansetron HCl  8 mg  ONCE  ONCE


 IVP  22 15:15


 22 15:16 DC 22 15:19


8 MG








Vital Signs/I&O











 22





 15:00


 


Temp 36.8


 


Pulse 89


 


Resp 20


 


B/P (MAP) 127/77 (94)





Capillary Refill :


Progress Note :  


   Time:  15:19


Progress Note


Significant increase in swelling and pain.  Abscess?  Plan to cover with broad-

spectrum antibiotics, obtain cultures and obtain ultrasound to make sure he has 

good blood flow to the testicle.  Redraw labs and blood cultures.





Diagnostic Imaging





   Diagonstic Imaging:  Ultrasound


   Plain Films/CT/US/NM/MRI:  other (Scrotum)


Comments


                 [facility] [place], [place]





NAME:   LIVIAYESENIA J


[facility] REC#:   [id number]


ACCOUNT#:   [id number]


PT STATUS:   REG ER


:   1955


PHYSICIAN:   HARDEEP COLEMAN MD


ADMIT DATE:   22/ER


                                  ***Signed***


Date of Exam:22





US SCROTUM (Testicle) [id number]








PROCEDURE: US Scrotum.





TECHNIQUE: Multiple real-time grayscale images were obtained over


the scrotum in various projections bilaterally.





INDICATION: Swelling, pain.





COMPARISON: May 06, 2022.





FINDINGS: The right testicle is mildly enlarged measuring 5.5 x


3.8 x 2.9 cm. It maintains a homogeneous echotexture without


intratesticular mass. Vascular flow throughout the right testicle


has increased throughout.





The right epididymis is prominent with diffusely increased


vascularity throughout.





Small right-sided hydrocele.





The left testicle measures 3.9 x 2.4 x 1.8 cm. It maintains a


homogeneous echotexture without discrete intratesticular mass.


Vascular flow is noted within the left testicle.





Tiny anechoic cyst is noted within the left epididymal head.


Otherwise, the left epididymis is unremarkable.





No significant left-sided hydrocele.





IMPRESSION: Findings consistent with right-sided


epididymo-orchitis.





Small right-sided hydrocele.





Small left epididymal head cyst.





Dictated by: 





  Dictated on workstation # [id number]








Dict:   22 1621


Trans:   22 1631


[id number]





Interpreted by:     NAHEED QUEZADA MD


Electronically signed by: NAHEED QUEZADA MD 22 1631


   Reviewed:  Reviewed by Me





Departure


Communication (Admissions)


Time/Spoke to Admitting Phy:  16:10


Discussed the case with Dr. Mcdonald and she agrees to admit the patient on broad-

spectrum antibiotics, gonorrhea and chlamydia on the urinalysis and pain 

management.  She will write queued orders.


Time/Spoke to Consulting Phy:  16:15


Discussed the case with Dr. Tawil, urology and he agrees to consult on the case.

 He wants scrotal support and bedrest with bathroom privileges.  He is okay with

cefepime and vancomycin.





Impression





   Primary Impression:  


   Orchitis and epididymitis


   Additional Impression:  


   Sepsis


   Qualified Codes:  A41.9 - Sepsis, unspecified organism


Disposition:   ADMITTED AS INPATIENT


Condition:  Stable





Admissions


Decision to Admit Reason:  Admit from ER (General)


Decision to Admit/Date:  May 9, 2022


Time/Decision to Admit Time:  16:10





Departure-Patient Inst.


Referrals:  


ESTRELLITA RAVI MD (PCP/Family)


Primary Care Physician











HARDEEP COLEMAN                  May 9, 2022 15:19

## 2022-05-09 NOTE — HISTORY & PHYSICAL
TOBI GAMBINO A MED STUDENT 5/9/22 1718:


History of Present Illness


History of Present Illness


Reason for visit/HPI


68 yo male with a past medical hx of diabetes and recent spinal surgery 

presented for right testicular swelling. Pt reports a week and a half ago he had

burning with urination in the morning that improve throughout the day. Then on 

Friday he noticed right testicular swelling and pain. Pt states he came to the 

ED and was given Rocephin and Doxycycline which he was compliant with taking. Pt

was also prescribed opioid pain medication for a recent spinal surgery that he 

was taking for the pain. Pt reports the testicular swelling and pain worsened so

he reported back to the ED today. Pt states he has had some nausea and 

constipation since starting the opioids. Pt reports having a fever of 101 

yesterday, but this morning it was 96. Pt denies SOA, chest pain, palpitations, 

dysuria, urinary frequency, back pain or LE edema.


Date of Admission


May 9, 2022 at 16:21


Date Seen by a Provider:  May 9, 2022


Time Seen by a Provider:  17:00


I consulted on this patient on


5/9/22


 17:16


Attending Physician


Ruslan Lombardi MD


Admitting Physician


Mariano Langford MD


Consult








Allergies and Home Medications


Allergies


Coded Allergies:  


     morphine (Verified  Allergy, Unknown, HIVES, 5/9/22)





Patient Home Medication List


Doxycycline Hyclate (Doxycycline Hyclate) 100 Mg Tablet, 100 MG PO BID


   Prescribed by: HARDEEP COLEMAN on 5/6/22 2216


Hydrocodone/Acetaminophen (Hydrocodone-Acetamin 5-325 mg) 1 Each Tablet, 1 TAB 

PO Q6H PRN for PAIN-MODERATE (5-7)


   Prescribed by: HARDEEP COLEMAN on 4/5/21 2120


Ondansetron (Ondansetron Odt) 4 Mg Tab.rapdis, 4 MG PO Q6H PRN for 

NAUSEA/VOMITING


   Prescribed by: HARDEEP COLEMAN on 5/6/22 2216


Oxycodone HCl/Acetaminophen (Percocet 5-325 mg Tablet) 5 Mg-325 Mg Tablet, 1-2 

TAB PO Q4H PRN for PAIN-MODERATE


   Prescribed by: HARDEEP COLEMAN on 5/6/22 2216





Past Medical-Social-Family Hx


Patient Social History


Tobacco Use?:  No


Use of E-Cig and/or Vaping dev:  No


Substance use?:  Yes


Substance type:  Marijuana


Substance frequency:  Several times a month


Alcohol Use?:  No


Pt feels they are or have been:  No





Immunizations Up To Date


Tetanus Booster (TDap):  Unknown





Seasonal Allergies


Seasonal Allergies:  No





Current Status


Communicates:  Verbally


Primary Language:  English


Preferred Spoken Language:  English





Past Medical History


Surgeries:  Orthopedic


Diabetes, Non-Insulin dep





Review of Systems


Constitutional:  No chills, No fever


EENTM:  No hearing loss, No vision loss


Respiratory:  cough; No dyspnea on exertion


Cardiovascular:  No chest pain, No palpitations


Gastrointestinal:  No abdominal pain; constipation; No diarrhea


Genitourinary:  No decreased output, No dysuria, No frequency; pain (scrotal 

pain)


Musculoskeletal:  No back pain, No joint swelling


Skin:  No change in color, No change in hair/nails


Psychiatric/Neurological:  Denies Anxiety, Denies Depressed





Physical Exam


Vital Signs





Vital Signs - First Documented








 5/9/22





 15:00


 


Temp 36.8


 


Pulse 89


 


Resp 20


 


B/P (MAP) 127/77 (94)





Capillary Refill :


Height, Weight, BMI


Height: '"


Weight: lbs. oz. kg; 34.00 BMI


Method:


General Appearance:  No Apparent Distress, WD/WN


HEENT:  PERRL/EOMI, TMs Normal


Neck:  Full Range of Motion, Normal Inspection


Respiratory:  Chest Non Tender, Lungs Clear, Normal Breath Sounds, No Accessory 

Muscle Use, No Respiratory Distress


Cardiovascular:  Regular Rate, Rhythm, No Edema, No Murmur


Gastrointestinal:  Normal Bowel Sounds, No Organomegaly, No Pulsatile Mass, Non 

Tender


Rectal:  Deferred


Genital/Rectal:  Other (right scrotum is erythematous and edematous)


Extremity:  Normal Capillary Refill, Normal Inspection, No Pedal Edema


Neurologic/Psychiatric:  Alert, Oriented x3, No Motor/Sensory Deficits, Normal 

Mood/Affect, CNs II-XII Norm as Tested


Skin:  Erythema (right scrotum)


Lymphatic:  No Adenopathy





Assessment/Plan


Assessment and Plan


Right orchitis


   -Failed outpatient antibiotic treatment with Rocephin and doxycycline


   -Urology consulted


   -Scrotal U/S pending


   -Cefepime and Vanc started


   -Dilaudid for pain management


   -Chlamydia and Gonorrhea ordered


Diabetes


   -SSI


Recent hx of spinal surgery with opioid use





DVT prophylaxis with SCDs


Diet- Regular





Admission Diagnosis


Admission Status:  Inpatient Order (span 2 midnights)





GAULT,RUSLAN R MD 5/9/22 1805:


Allergies and Home Medications


Allergies


Coded Allergies:  


     morphine (Verified  Allergy, Unknown, HIVES, 5/9/22)





Patient Home Medication List


Home Medication List Reviewed:  Yes


Doxycycline Hyclate (Doxycycline Hyclate) 100 Mg Tablet, 100 MG PO BID


   Prescribed by: HARDEEP COLEMAN on 5/6/22 2216


Hydrocodone/Acetaminophen (Hydrocodone-Acetamin 5-325 mg) 1 Each Tablet, 1 TAB 

PO Q6H PRN for PAIN-MODERATE (5-7)


   Prescribed by: HARDEEP COLEMAN on 4/5/21 2120


Ondansetron (Ondansetron Odt) 4 Mg Tab.rapdis, 4 MG PO Q6H PRN for 

NAUSEA/VOMITING


   Prescribed by: HARDEEP COLEMAN on 5/6/22 2216


Oxycodone HCl/Acetaminophen (Percocet 5-325 mg Tablet) 5 Mg-325 Mg Tablet, 1-2 

TAB PO Q4H PRN for PAIN-MODERATE


   Prescribed by: HARDEEP COLEMAN on 5/6/22 2216





Past Medical-Social-Family Hx


Past Medical History


Surgeries:  Orthopedic (Spine surgery)


Diabetes, Non-Insulin dep





Physical Exam


General Appearance:  No Apparent Distress, WD/WN, Obese


Respiratory:  Chest Non Tender, Lungs Clear, Normal Breath Sounds


Cardiovascular:  Regular Rate, Rhythm, No Edema, No Murmur


Gastrointestinal:  Normal Bowel Sounds, Non Tender, Soft


Genital/Rectal:  Other (right scrotum is erythematous and edematous, moderate 

ttp)


Back:  No CVA Tenderness


Extremity:  Normal Capillary Refill, Normal Inspection, No Pedal Edema


Neurologic/Psychiatric:  Alert, Oriented x3, No Motor/Sensory Deficits, Normal 

Mood/Affect, CNs II-XII Norm as Tested


Skin:  Erythema (right scrotum)


Lymphatic:  No Adenopathy





Supervisory-Addendum Brief


Verification & Attestation


Participated in pt care:  history, physical


Personally performed:  exam, history


Care discussed with:  Medical Student


Procedures:  n/a


Verification and Attestation of Medical Student E/M Service





A medical student performed and documented this service in my presence. I 

reviewed and verified all information documented by the medical student and made

modifications to such information, when appropriate. I personally performed the 

physical exam and medical decision making. 





 Ruslan Lombardi, May 9, 2022,18:03





Right Testicular Pain


Right Orcitis: IV antibiotics, monitor for improvement, failed outpatient 

treatment, GC/Chyl pending


Right epididimytsis


NIDDM: A1c pending, SSI


Elevated bilirubin: Abdominal US pending


Obesity











TOBI GAMBINO MED STUDENT     May 9, 2022 17:18


RUSLAN LOMBARDI MD                May 9, 2022 18:05

## 2022-05-10 VITALS — SYSTOLIC BLOOD PRESSURE: 105 MMHG | DIASTOLIC BLOOD PRESSURE: 70 MMHG

## 2022-05-10 VITALS — DIASTOLIC BLOOD PRESSURE: 70 MMHG | SYSTOLIC BLOOD PRESSURE: 110 MMHG

## 2022-05-10 VITALS — DIASTOLIC BLOOD PRESSURE: 71 MMHG | SYSTOLIC BLOOD PRESSURE: 122 MMHG

## 2022-05-10 VITALS — DIASTOLIC BLOOD PRESSURE: 73 MMHG | SYSTOLIC BLOOD PRESSURE: 120 MMHG

## 2022-05-10 VITALS — DIASTOLIC BLOOD PRESSURE: 83 MMHG | SYSTOLIC BLOOD PRESSURE: 129 MMHG

## 2022-05-10 VITALS — SYSTOLIC BLOOD PRESSURE: 116 MMHG | DIASTOLIC BLOOD PRESSURE: 77 MMHG

## 2022-05-10 LAB
ALBUMIN SERPL-MCNC: 3.5 GM/DL (ref 3.2–4.5)
ALP SERPL-CCNC: 55 U/L (ref 40–136)
ALT SERPL-CCNC: 33 U/L (ref 0–55)
BASOPHILS # BLD AUTO: 0.1 10^3/UL (ref 0–0.1)
BASOPHILS NFR BLD AUTO: 1 % (ref 0–10)
BILIRUB SERPL-MCNC: 1.9 MG/DL (ref 0.1–1)
BUN/CREAT SERPL: 11
CALCIUM SERPL-MCNC: 8.8 MG/DL (ref 8.5–10.1)
CHLORIDE SERPL-SCNC: 108 MMOL/L (ref 98–107)
CO2 SERPL-SCNC: 19 MMOL/L (ref 21–32)
CREAT SERPL-MCNC: 0.81 MG/DL (ref 0.6–1.3)
EOSINOPHIL # BLD AUTO: 0.3 10^3/UL (ref 0–0.3)
EOSINOPHIL NFR BLD AUTO: 4 % (ref 0–10)
GFR SERPLBLD BASED ON 1.73 SQ M-ARVRAT: 98 ML/MIN
GLUCOSE SERPL-MCNC: 142 MG/DL (ref 70–105)
HCT VFR BLD CALC: 38 % (ref 40–54)
HGB BLD-MCNC: 12.2 G/DL (ref 13.3–17.7)
LYMPHOCYTES # BLD AUTO: 1.5 10^3/UL (ref 1–4)
LYMPHOCYTES NFR BLD AUTO: 18 % (ref 12–44)
MANUAL DIFFERENTIAL PERFORMED BLD QL: NO
MCH RBC QN AUTO: 29 PG (ref 25–34)
MCHC RBC AUTO-ENTMCNC: 32 G/DL (ref 32–36)
MCV RBC AUTO: 91 FL (ref 80–99)
MONOCYTES # BLD AUTO: 0.8 10^3/UL (ref 0–1)
MONOCYTES NFR BLD AUTO: 9 % (ref 0–12)
NEUTROPHILS # BLD AUTO: 5.8 10^3/UL (ref 1.8–7.8)
NEUTROPHILS NFR BLD AUTO: 69 % (ref 42–75)
PLATELET # BLD: 264 10^3/UL (ref 130–400)
PMV BLD AUTO: 10.9 FL (ref 9–12.2)
POTASSIUM SERPL-SCNC: 4 MMOL/L (ref 3.6–5)
PROT SERPL-MCNC: 6.8 GM/DL (ref 6.4–8.2)
SODIUM SERPL-SCNC: 139 MMOL/L (ref 135–145)
WBC # BLD AUTO: 8.5 10^3/UL (ref 4.3–11)

## 2022-05-10 RX ADMIN — SODIUM CHLORIDE SCH MLS/HR: 900 INJECTION INTRAVENOUS at 16:11

## 2022-05-10 RX ADMIN — DOCUSATE SODIUM SCH MG: 100 CAPSULE ORAL at 08:48

## 2022-05-10 RX ADMIN — HYDROMORPHONE HYDROCHLORIDE PRN MG: 2 INJECTION, SOLUTION INTRAMUSCULAR; INTRAVENOUS; SUBCUTANEOUS at 11:29

## 2022-05-10 RX ADMIN — POLYETHYLENE GLYCOL (3350) SCH GM: 17 POWDER, FOR SOLUTION ORAL at 08:48

## 2022-05-10 RX ADMIN — VANCOMYCIN HYDROCHLORIDE SCH MLS/HR: 500 INJECTION, POWDER, LYOPHILIZED, FOR SOLUTION INTRAVENOUS at 18:34

## 2022-05-10 RX ADMIN — VANCOMYCIN HYDROCHLORIDE SCH MLS/HR: 500 INJECTION, POWDER, LYOPHILIZED, FOR SOLUTION INTRAVENOUS at 05:50

## 2022-05-10 RX ADMIN — SODIUM CHLORIDE SCH MLS/HR: 900 INJECTION INTRAVENOUS at 22:38

## 2022-05-10 RX ADMIN — HYDROMORPHONE HYDROCHLORIDE PRN MG: 2 INJECTION, SOLUTION INTRAMUSCULAR; INTRAVENOUS; SUBCUTANEOUS at 00:48

## 2022-05-10 RX ADMIN — SODIUM CHLORIDE SCH MLS/HR: 900 INJECTION, SOLUTION INTRAVENOUS at 16:11

## 2022-05-10 RX ADMIN — SODIUM CHLORIDE SCH MLS/HR: 900 INJECTION, SOLUTION INTRAVENOUS at 04:14

## 2022-05-10 RX ADMIN — DOCUSATE SODIUM SCH MG: 100 CAPSULE ORAL at 22:38

## 2022-05-10 RX ADMIN — SODIUM CHLORIDE SCH MLS/HR: 900 INJECTION, SOLUTION INTRAVENOUS at 11:36

## 2022-05-10 RX ADMIN — ENOXAPARIN SODIUM SCH MG: 100 INJECTION SUBCUTANEOUS at 16:11

## 2022-05-10 RX ADMIN — SODIUM CHLORIDE SCH MLS/HR: 900 INJECTION INTRAVENOUS at 04:09

## 2022-05-10 RX ADMIN — SODIUM CHLORIDE SCH MLS/HR: 900 INJECTION INTRAVENOUS at 11:36

## 2022-05-10 RX ADMIN — HYDROMORPHONE HYDROCHLORIDE PRN MG: 2 INJECTION, SOLUTION INTRAMUSCULAR; INTRAVENOUS; SUBCUTANEOUS at 05:50

## 2022-05-10 NOTE — PROGRESS NOTE
TOBI GAMBINO A MED STUDENT 5/10/22 0843:


Subjective


Subjective/Events-last exam


Pt up in bed this morning reports he is feeling much better today. Pt states the

swelling in his right scrotum has decreased, but he still has pain that is about

the same as yesterday. Pt reports last BM was Friday morning, but he hasn't ate 

much since then. Pt does report his appetite is increasing and he ate breakfast 

this morning. Pt denies fever, chills, SOA, chest pain, palpitations, dysuria, 

urinary frequency or N/V/D.


Review of Systems


General:  No Chills, No Fatigue


HEENT:  No Head Aches, No Visual Changes


Pulmonary:  No Dyspnea; Cough


Cardiovascular:  No: Chest Pain, Palpitations


Gastrointestinal:  Constipation; No: Nausea, Vomiting, Abdominal Pain, Diarrhea


Genitourinary:  No Dysuria, No Frequency


Musculoskeletal:  No: neck pain


Neurological:  No: Weakness, Numbness





Focused Exam


Lactate Level


5/9/22 15:22: Lactic Acid Level 1.77





Objective


Exam


Last Set of Vital Signs





Vital Signs








  Date Time  Temp Pulse Resp B/P (MAP) Pulse Ox O2 Delivery O2 Flow Rate FiO2


 


5/10/22 08:30      Room Air  


 


5/10/22 07:52 36.9 73 18 120/73 (89) 96   





Capillary Refill :


I&O











Intake and Output 


 


 5/10/22





 00:00


 


Intake Total 2850 ml


 


Output Total 300 ml


 


Balance 2550 ml


 


 


 


Intake Oral 500 ml


 


IV Total 2350 ml


 


Output Urine Total 300 ml


 


# Voids 4


 


Daily Weight Change No








General:  Alert, Oriented X3


HEENT:  Atraumatic, PERRLA


Neck:  No Thyromegaly, No LAD


Lungs:  Clear to Auscultation, Normal Air Movement


Heart:  Regular Rate, Normal S1, Normal S2


Abdomen:  Normal Bowel Sounds, Soft, No Tenderness


Skin:  Other (right scrotal edema and erythema improved from yesterday)


Neuro:  Normal Speech, Normal Tone, Sensation Intact


Psych/Mental Status:  Mental Status NL, Mood NL





Results/Procedures


Lab


Laboratory Tests


5/9/22 15:00: 


White Blood Count 12.2H, Red Blood Count 4.63, Hemoglobin 13.6, Hematocrit 41, 

Mean Corpuscular Volume 89, Mean Corpuscular Hemoglobin 29, Mean Corpuscular 

Hemoglobin Concent 33, Red Cell Distribution Width 13.5, Platelet Count 289, 

Mean Platelet Volume 10.8, Immature Granulocyte % (Auto) 0, Neutrophils (%) 

(Auto) 74, Lymphocytes (%) (Auto) 15, Monocytes (%) (Auto) 8, Eosinophils (%) 

(Auto) 3, Basophils (%) (Auto) 0, Neutrophils # (Auto) 9.1H, Lymphocytes # 

(Auto) 1.9, Monocytes # (Auto) 0.9, Eosinophils # (Auto) 0.3, Basophils # (Auto)

0.0, Immature Granulocyte # (Auto) 0.0, Prothrombin Time 14.7, INR Comment 1.1, 

Activated Partial Thromboplast Time 37H, Sodium Level 138, Potassium Level 4.1, 

Chloride Level 102, Carbon Dioxide Level 21, Anion Gap 15H, Blood Urea Nitrogen 

12, Creatinine 0.91, Estimat Glomerular Filtration Rate 94, BUN/Creatinine Ratio

13, Glucose Level 162H, Calcium Level 10.0, Corrected Calcium 10.0, Total B

ilirubin 1.7H, Aspartate Amino Transf (AST/SGOT) 42H, Alanine Aminotransferase 

(ALT/SGPT) 36, Alkaline Phosphatase 71, Total Protein 8.2, Albumin 4.0


5/9/22 15:10: 


5/9/22 15:22: Lactic Acid Level 1.77


5/9/22 17:14: 


Urine Color YELLOW, Urine Clarity SL CLOUDY, Urine pH 5.5, Urine Specific 

Gravity 1.025H, Urine Protein NEGATIVE, Urine Glucose (UA) NEGATIVE, Urine 

Ketones TRACEH, Urine Nitrite NEGATIVE, Urine Bilirubin NEGATIVE, Urine 

Urobilinogen 0.2, Urine Leukocyte Esterase 2+H, Urine RBC (Auto) NEGATIVE, Urine

RBC NONE, Urine WBC 10-25H, Urine Squamous Epithelial Cells NONE, Urine Crystals

NONE, Urine Bacteria NEGATIVE, Urine Casts NONE, Urine Mucus NEGATIVE, Urine 

Culture Indicated YES


5/10/22 05:20: 


White Blood Count 8.5, Red Blood Count 4.16L, Hemoglobin 12.2L, Hematocrit 38L, 

Mean Corpuscular Volume 91, Mean Corpuscular Hemoglobin 29, Mean Corpuscular 

Hemoglobin Concent 32, Red Cell Distribution Width 13.6, Platelet Count 264, 

Mean Platelet Volume 10.9, Immature Granulocyte % (Auto) 1, Neutrophils (%) 

(Auto) 69, Lymphocytes (%) (Auto) 18, Monocytes (%) (Auto) 9, Eosinophils (%) 

(Auto) 4, Basophils (%) (Auto) 1, Neutrophils # (Auto) 5.8, Lymphocytes # (Auto)

1.5, Monocytes # (Auto) 0.8, Eosinophils # (Auto) 0.3, Basophils # (Auto) 0.1, 

Immature Granulocyte # (Auto) 0.0, Sodium Level 139, Potassium Level 4.0, 

Chloride Level 108H, Carbon Dioxide Level 19L, Anion Gap 12, Blood Urea Nitrogen

9, Creatinine 0.81, Estimat Glomerular Filtration Rate 98, BUN/Creatinine Ratio 

11, Glucose Level 142H, Calcium Level 8.8, Corrected Calcium 9.2, Total 

Bilirubin 1.9H, Aspartate Amino Transf (AST/SGOT) 32, Alanine Aminotransferase 

(ALT/SGPT) 33, Alkaline Phosphatase 55, Total Protein 6.8, Albumin 3.5





Assessment/Plan


Assessment/Plan


Assessment & Plan


Right orchitis


   -Failed outpatient antibiotic treatment with Rocephin and doxycycline


   -Urology consulted


   -Scrotal U/S showed orchitis


   -Continue cefepime


   -Stop vancomycin 


   -Dilaudid for pain management


   -Chlamydia and Gonorrhea pending


Diabetes


   -SSI


Recent hx of spinal surgery with opioid use





DVT prophylaxis with SCDs


Diet- Regular





Clinical Quality Measures


Admission Status


Admission Dx


Right orchitis


   -Failed outpatient antibiotic treatment with Rocephin and doxycycline


   -Urology consulted


   -Scrotal U/S pending


   -Cefepime and Vanc started


   -Dilaudid for pain management


   -Chlamydia and Gonorrhea ordered


Diabetes


   -SSI


Recent hx of spinal surgery with opioid use





DVT prophylaxis with SCDs


Diet- Regular





RUSLAN MCDONALD MD 5/10/22 2035:


Objective


Exam


General:  Alert, Oriented X3


Lungs:  Clear to Auscultation, Normal Air Movement


Heart:  Regular Rate, No Murmurs


Abdomen:  Normal Bowel Sounds, Soft, No Tenderness


Other physical findings


Right testicle with swelling and erythema (improvement from yesterday)





Assessment/Plan


Supervisory Addendum


Verification and Attestation of Medical Student E/M Service





A medical student performed and documented this service in my presence. I 

reviewed and verified all information documented by the medical student and made

modifications to such information, when appropriate. I personally performed the 

physical exam and medical decision making. 





 Ruslan Mcdonald, May 10, 2022,20:34


  





Improvement since yesterday, will do 1 more day of IV antibiotics and then 

possible d/c


Liver US revealed diffuse fatty infiltration, would recommend further outpatient

workup











TOBI GAMBINO MED STUDENT    May 10, 2022 08:43


RUSLAN MCDONALD MD               May 10, 2022 20:35

## 2022-05-10 NOTE — DIAGNOSTIC IMAGING REPORT
INDICATION: Elevated bilirubin.



TECHNIQUE: Ultrasound of the liver and right upper quadrant was

performed in the routine fashion.



FINDINGS: The liver shows diffuse increased echogenicity

compatible with fatty infiltration, liver is somewhat poorly

visualized but no definite focal lesion is seen. Portal vein is

patent with hepatopetal flow. Gallbladder appears unremarkable

with no dilatation or stones or wall thickening. Common bile duct

could not be well visualized. Pancreas is obscured by overlying

gas. Visualized portions of the aorta and IVC appear normal.

Right kidney measured 10.5 cm in length and appears unremarkable.

There is no ascites.



IMPRESSION: Limited study due to body habitus and overlying bowel

gas. There is diffuse fatty infiltration of the liver without

focal lesion. Common bile duct was not well seen due to overlying

gas. There was no other significant finding.



Dictated by: 



  Dictated on workstation # [id number]

## 2022-05-11 VITALS — DIASTOLIC BLOOD PRESSURE: 67 MMHG | SYSTOLIC BLOOD PRESSURE: 141 MMHG

## 2022-05-11 VITALS — SYSTOLIC BLOOD PRESSURE: 105 MMHG | DIASTOLIC BLOOD PRESSURE: 71 MMHG

## 2022-05-11 VITALS — SYSTOLIC BLOOD PRESSURE: 110 MMHG | DIASTOLIC BLOOD PRESSURE: 74 MMHG

## 2022-05-11 VITALS — DIASTOLIC BLOOD PRESSURE: 73 MMHG | SYSTOLIC BLOOD PRESSURE: 135 MMHG

## 2022-05-11 VITALS — SYSTOLIC BLOOD PRESSURE: 141 MMHG | DIASTOLIC BLOOD PRESSURE: 67 MMHG

## 2022-05-11 LAB
BASOPHILS # BLD AUTO: 0.1 10^3/UL (ref 0–0.1)
BASOPHILS NFR BLD AUTO: 1 % (ref 0–10)
BUN/CREAT SERPL: 10
CALCIUM SERPL-MCNC: 9.2 MG/DL (ref 8.5–10.1)
CHLORIDE SERPL-SCNC: 108 MMOL/L (ref 98–107)
CO2 SERPL-SCNC: 20 MMOL/L (ref 21–32)
CREAT SERPL-MCNC: 0.79 MG/DL (ref 0.6–1.3)
EOSINOPHIL # BLD AUTO: 0.3 10^3/UL (ref 0–0.3)
EOSINOPHIL NFR BLD AUTO: 4 % (ref 0–10)
GFR SERPLBLD BASED ON 1.73 SQ M-ARVRAT: 99 ML/MIN
GLUCOSE SERPL-MCNC: 163 MG/DL (ref 70–105)
HCT VFR BLD CALC: 39 % (ref 40–54)
HGB BLD-MCNC: 12.6 G/DL (ref 13.3–17.7)
LYMPHOCYTES # BLD AUTO: 1.4 10^3/UL (ref 1–4)
LYMPHOCYTES NFR BLD AUTO: 19 % (ref 12–44)
MANUAL DIFFERENTIAL PERFORMED BLD QL: NO
MCH RBC QN AUTO: 29 PG (ref 25–34)
MCHC RBC AUTO-ENTMCNC: 33 G/DL (ref 32–36)
MCV RBC AUTO: 90 FL (ref 80–99)
MONOCYTES # BLD AUTO: 0.6 10^3/UL (ref 0–1)
MONOCYTES NFR BLD AUTO: 8 % (ref 0–12)
NEUTROPHILS # BLD AUTO: 4.9 10^3/UL (ref 1.8–7.8)
NEUTROPHILS NFR BLD AUTO: 68 % (ref 42–75)
PLATELET # BLD: 290 10^3/UL (ref 130–400)
PMV BLD AUTO: 10.4 FL (ref 9–12.2)
POTASSIUM SERPL-SCNC: 4.2 MMOL/L (ref 3.6–5)
SODIUM SERPL-SCNC: 141 MMOL/L (ref 135–145)
VANCOMYCIN TROUGH SERPL-MCNC: 16.4 UG/ML (ref 10–20)
WBC # BLD AUTO: 7.3 10^3/UL (ref 4.3–11)

## 2022-05-11 RX ADMIN — POLYETHYLENE GLYCOL (3350) SCH GM: 17 POWDER, FOR SOLUTION ORAL at 09:45

## 2022-05-11 RX ADMIN — SODIUM CHLORIDE SCH MLS/HR: 900 INJECTION, SOLUTION INTRAVENOUS at 03:29

## 2022-05-11 RX ADMIN — SODIUM CHLORIDE SCH MLS/HR: 900 INJECTION INTRAVENOUS at 10:33

## 2022-05-11 RX ADMIN — SODIUM CHLORIDE SCH MLS/HR: 900 INJECTION, SOLUTION INTRAVENOUS at 09:45

## 2022-05-11 RX ADMIN — VANCOMYCIN HYDROCHLORIDE SCH MLS/HR: 500 INJECTION, POWDER, LYOPHILIZED, FOR SOLUTION INTRAVENOUS at 06:39

## 2022-05-11 RX ADMIN — DOCUSATE SODIUM SCH MG: 100 CAPSULE ORAL at 09:45

## 2022-05-11 RX ADMIN — SODIUM CHLORIDE SCH MLS/HR: 900 INJECTION INTRAVENOUS at 05:16

## 2022-05-11 NOTE — DISCHARGE SUMMARY
Discharge [facility]


Reconcile Patient Problems


Problems Reviewed?:  Yes





Discharge Medications


New, Converted or Re-Newed RX:  Transmitted to Pharmacy


New Medications:  


Ciprofloxacin (Cipro) 500 Mg/5 Ml Estella.[email or web address].rec


500 MG PO BID for 10 Days, #20 TAB





 


Continued Medications:  


Doxycycline Hyclate (Doxycycline Hyclate) 100 Mg Tablet


100 MG PO BID, TAB


FILLED 05- #20/10 DAY SUPPLY


Metformin HCl (Metformin HCl) 500 Mg Tablet


500 MG PO BID, TAB





Multivitamin (Multivitamin) 1 Each Tablet


1 EACH PO DAILY, TAB





Oxycodone HCl (Oxycodone HCl) 5 Mg Tablet


5-10 MG PO Q6H PRN for PAIN-SEVERE (8-10), TAB











Patient Instructions


Goal/Follow Up Appt:  


F.u with PCP in 1-2 weeks


Patient Instructions:  


- Continue your doxycycline and add cipro and be sure to complete your


antibiotics





Activity & Diet


Discharge Diet:  ADA Diet


Activity as Tolerated:  Yes





Copy


Copies To 1:   DENIS VERGARA MD, HOLLY R MD               May 11, 2022 12:05

## 2022-05-11 NOTE — CONSULTATION REPORT
DATE OF SERVICE:  05/10/2022



ATTENDING PHYSICIAN:

Dr. Wang.



SUMMARY:

After reviewing the patient's record, interviewing him and examining him, this

is a 67-year-old white man who was seen 2-3 days prior to his admission with

right epididymitis.  He was referred to the office for followup; however, he

claims that the day of his admission increase pain and swelling.  He was seen at

the emergency room for which he was admitted because of increased

epididymo-orchitis on the right side and he was developing abscess.  Ultrasound

revealed increasing orchitis but no evidence of abscess.



On physical exam, he does have an obvious epididymo-orchitis on right side. 

Left side is normal.



IMPRESSION:

Acute right epididymo-orchitis.



PLAN:

Continue present management with scrotal  support,  vancomycin along with 
Cefepim

and he said that he is better on these antibiotics, however, he is scheduled to 
get

an abdominal ultrasound to check on his gallbladder.  I will follow with you.





Job ID: [id number]

DocumentID: [id number]

Dictated Date:  05/10/2022 13:51:07

Transcription Date: 05/10/2022 14:03:38

Dictated By: ELIAS TAWIL, MD

MTDD

## 2022-05-11 NOTE — PROGRESS NOTE - UROLOGY
Progress Note-Urology


Progress Notes/Assess & Plan


Progress/Assessment & Plan


DOING AND FEELING MUCH BETTER. HOME ON 2 ABX, REST, AND SCROTAL SUPPORT. SEE ME 

PRN


Final Diagnosis


RT EPIDIDYMOORCHITIS











TAWIL,ELIAS A MD               May 11, 2022 12:29

## 2022-05-11 NOTE — PHYSICIAN QUERY CLARIFICATION
Physician Query-General


Query to Physician:


Clinical Validation Clarification


Dr LUANNE Mcdonald


Sepsis has been documented in the medical record. 


After study, has Sepsis been ruled out? If it has been ruled out, please 

document Sepsis ruled out" in the progress notes and/or discharge summary.





1. Yes, Sepsis ruled out/not clinically valid


2. No, Sepsis has not been ruled out/is clinically valid*


*Please document the clinical evidence supportive of this diagnosis (even if now

resolved) in the Progress Notes and Discharge Summary 


3. Other, with explanation of the clinical findings


4. Clinically undetermined, no explanation for the clinical findings





Additional information: DM, with Orchitis/Epididymoorchitis Admission labs/VS: 

HR 89,  RR 20, /77, SpO2 98% sat on room air, T 36.8, WBC 12.2, Lactic 

acid 1.77, BC X 2 No Growth, Treatment in ER:  Normal saline 2 L, vancomycin IV,

cefepime IV, 





In responding to this query, please exercise your independent professional 

judgment.  The purpose of this communication is to more accurately reflect the 

complexity of your patients condition. The fact that a question is asked does 

not imply that any particular answer is desired or expected.  





Thank you for your timely response to this clarification.





Zoya Rosas MSN, RN


Clinical 


[phone] 


[email or web address]





PHYSICIAN RESPONSE:


Based on the clinical findings in the record, please respond to the query above 

on this document as an addendum. 








Physician Response:


Physician Response


Yes patient was not septic














If you have questions please contact:


                   


:


Ext:





Thank you for your time and cooperation.


Clinical /








*********************This is a permanent part of the medical 

record*******************











ZOYA ROSAS                   May 11, 2022 18:25


RUSLAN MCDONALD MD               May 26, 2022 10:19

## 2022-05-11 NOTE — DISCHARGE SUMMARY
Diagnosis/Chief Complaint


Date of Admission


May 9, 2022 at 16:21


Date of Discharge





Discharge Date:  May 11, 2022


Admission Diagnosis


Admission Diagnosis


Right orchitis





Discharge Diagnosis


Right orchitis





Reason Hospital Visit


68 yo male with a past medical hx of diabetes and recent spinal surgery 

presented for right testicular swelling. Pt reports a week and a half ago he had

burning with urination in the morning that improve throughout the day. Then on 

Friday he noticed right testicular swelling and pain. Pt states he came to the 

ED and was given Rocephin and Doxycycline which he was compliant with taking. Pt

was also prescribed opioid pain medication for a recent spinal surgery that he 

was taking for the pain. Pt reports the testicular swelling and pain worsened so

he reported back to the ED today. Pt states he has had some nausea and 

constipation since starting the opioids. Pt reports having a fever of 101 

yesterday, but this morning it was 96. Pt denies SOA, chest pain, palpitations, 

dysuria, urinary frequency, back pain or LE edema.





Discharge Summary


Hospital Course


Hospital Course


68 yo male with a past medical hx of diabetes and recent spinal surgery 

presented for right testicular swelling. Pt reported a week and a half ago he 

had burning with urination in the morning that improve throughout the day. Then 

on Friday he noticed right testicular swelling and pain. Pt states he came to 

the ED and was given Rocephin and Doxycycline which he was compliant with 

taking. Pt was also prescribed opioid pain medication for a recent spinal 

surgery that he was taking for the pain. Pt reported the testicular swelling and

pain worsened so he reported back to the ED. Pt was admitted as inpatient and 

and started on Vancomycin and Cefepime. Urine culture revealed no growth. Pt had

elevated LFTs, so abominal U/S was done and pt was found to have fatty liver. 

Pt's scrotal swelling and erythema improved over the course of two days and it 

was determined he was stable for discharge. Pt sent home with cefdinir.


Labs


Laboratory Tests


5/9/22 15:00: 


White Blood Count 12.2H, Neutrophils # (Auto) 9.1H, Activated Partial 

Thromboplast Time 37H, Anion Gap 15H, Glucose Level 162H, Total Bilirubin 1.7H, 

Aspartate Amino Transf (AST/SGOT) 42H


5/9/22 15:10: 


Mean Blood Glucose 146H, Hemoglobin A1c 6.7H


5/9/22 15:22: 


5/9/22 17:14: 


Urine Specific Gravity 1.025H, Urine Ketones TRACEH, Urine Leukocyte Esterase 

2+H, Urine WBC 10-25H


5/10/22 05:20: 


Red Blood Count 4.16L, Hemoglobin 12.2L, Hematocrit 38L, Chloride Level 108H, 

Carbon Dioxide Level 19L, Glucose Level 142H, Total Bilirubin 1.9H


5/11/22 05:30: 


Hemoglobin 12.6L, Hematocrit 39L, Chloride Level 108H, Carbon Dioxide Level 20L,

Glucose Level 163H





Procedures


None.





Discharge Physical Examination


Allergies:  


Coded Allergies:  


     acetaminophen (Verified  Allergy, Unknown, Nausea, 5/10/22)


     morphine (Verified  Allergy, Unknown, HIVES, 5/9/22)


     propoxyphene (Verified  Allergy, Unknown, 5/10/22)


Vitals & I&Os





Vital Signs








  Date Time  Temp Pulse Resp B/P (MAP) Pulse Ox O2 Delivery O2 Flow Rate FiO2


 


5/11/22 09:00      Room Air  


 


5/11/22 08:28 36.9 69 19 135/73 (93) 96   








General Appearance:  Alert, Oriented X3


HEENT:  Atraumatic, PERRLA


Respiratory:  Clear to Auscultation, Normal Air Movement


Cardiovascular:  Regular Rate, Normal S1, Normal S2


Abdominal:  Normal Bowel Sounds, Soft, No Tenderness


Extremities:  No Edema, Normal Pulses


Skin:  No Rashes, No Breakdown, Other (right scrotal erythema and edema, 

improved)


Neuro:  Normal Gait, Normal Speech


Psych/Mental Status:  Mood NL





Discharge


Home Medications


Reviewed and agree with Discharge Medication list on patient's Discharge 

Instruction sheet


Instructions to Patient/Family


Please see electronic discharge instructions given to patient.











TOBI GAMBINO MED STUDENT    May 11, 2022 10:06

## 2023-04-11 ENCOUNTER — HOSPITAL ENCOUNTER (OUTPATIENT)
Dept: HOSPITAL 75 - LAB | Age: 68
End: 2023-04-11
Attending: FAMILY MEDICINE
Payer: MEDICARE

## 2023-04-11 DIAGNOSIS — E11.65: Primary | ICD-10-CM

## 2023-04-11 DIAGNOSIS — E55.9: ICD-10-CM

## 2023-04-11 LAB
ALBUMIN SERPL-MCNC: 4.3 GM/DL (ref 3.2–4.5)
ALP SERPL-CCNC: 49 U/L (ref 40–136)
ALT SERPL-CCNC: 50 U/L (ref 0–55)
BASOPHILS # BLD AUTO: 0 10^3/UL (ref 0–0.1)
BASOPHILS NFR BLD AUTO: 1 % (ref 0–10)
BILIRUB SERPL-MCNC: 1.1 MG/DL (ref 0.1–1)
BUN/CREAT SERPL: 15
CALCIUM SERPL-MCNC: 9.2 MG/DL (ref 8.5–10.1)
CHLORIDE SERPL-SCNC: 109 MMOL/L (ref 98–107)
CHOLEST SERPL-MCNC: 112 MG/DL (ref ?–200)
CO2 SERPL-SCNC: 22 MMOL/L (ref 21–32)
CREAT SERPL-MCNC: 1.14 MG/DL (ref 0.6–1.3)
EOSINOPHIL # BLD AUTO: 0.2 10^3/UL (ref 0–0.3)
EOSINOPHIL NFR BLD AUTO: 3 % (ref 0–10)
GFR SERPLBLD BASED ON 1.73 SQ M-ARVRAT: 71 ML/MIN
GLUCOSE SERPL-MCNC: 176 MG/DL (ref 70–105)
HCT VFR BLD CALC: 44 % (ref 40–54)
HDLC SERPL-MCNC: 37 MG/DL (ref 40–60)
HGB BLD-MCNC: 14.8 G/DL (ref 13.3–17.7)
LYMPHOCYTES # BLD AUTO: 1.7 10^3/UL (ref 1–4)
LYMPHOCYTES NFR BLD AUTO: 25 % (ref 12–44)
MANUAL DIFFERENTIAL PERFORMED BLD QL: NO
MCH RBC QN AUTO: 30 PG (ref 25–34)
MCHC RBC AUTO-ENTMCNC: 34 G/DL (ref 32–36)
MCV RBC AUTO: 89 FL (ref 80–99)
MONOCYTES # BLD AUTO: 0.5 10^3/UL (ref 0–1)
MONOCYTES NFR BLD AUTO: 7 % (ref 0–12)
NEUTROPHILS # BLD AUTO: 4.4 10^3/UL (ref 1.8–7.8)
NEUTROPHILS NFR BLD AUTO: 65 % (ref 42–75)
PLATELET # BLD: 253 10^3/UL (ref 130–400)
PMV BLD AUTO: 10.5 FL (ref 9–12.2)
POTASSIUM SERPL-SCNC: 4.3 MMOL/L (ref 3.6–5)
PROT SERPL-MCNC: 7.6 GM/DL (ref 6.4–8.2)
SODIUM SERPL-SCNC: 142 MMOL/L (ref 135–145)
TRIGL SERPL-MCNC: 80 MG/DL (ref ?–150)
VLDLC SERPL CALC-MCNC: 16 MG/DL (ref 5–40)
WBC # BLD AUTO: 6.9 10^3/UL (ref 4.3–11)

## 2023-04-11 PROCEDURE — 36415 COLL VENOUS BLD VENIPUNCTURE: CPT

## 2023-04-11 PROCEDURE — 80061 LIPID PANEL: CPT

## 2023-04-11 PROCEDURE — 85025 COMPLETE CBC W/AUTO DIFF WBC: CPT

## 2023-04-11 PROCEDURE — 82306 VITAMIN D 25 HYDROXY: CPT

## 2023-04-11 PROCEDURE — 80053 COMPREHEN METABOLIC PANEL: CPT

## 2023-04-11 PROCEDURE — 83036 HEMOGLOBIN GLYCOSYLATED A1C: CPT

## 2023-04-20 ENCOUNTER — HOSPITAL ENCOUNTER (OUTPATIENT)
Dept: HOSPITAL 75 - RAD | Age: 68
End: 2023-04-20
Attending: FAMILY MEDICINE
Payer: MEDICARE

## 2023-04-20 DIAGNOSIS — M50.30: ICD-10-CM

## 2023-04-20 DIAGNOSIS — M47.812: Primary | ICD-10-CM

## 2023-04-20 PROCEDURE — 72040 X-RAY EXAM NECK SPINE 2-3 VW: CPT

## 2023-04-20 NOTE — DIAGNOSTIC IMAGING REPORT
INDICATION: Neck pain.



FINDINGS: The alignment is normal. There is mild degenerative

disc disease at C4-C5 and moderate degenerative disease at C5-C6

and C6-C7. This includes some disc space narrowing and marginal

osteophytosis. There is no  fracture or traumatic subluxation.

The odontoid is intact and lateral masses are well aligned.

Prevertebral soft tissues are within normal limits.



IMPRESSION: Moderate cervical spondylosis and degenerative disc

disease as described.



Dictated by: 



  Dictated on workstation # [id number]

## 2023-07-10 ENCOUNTER — HOSPITAL ENCOUNTER (EMERGENCY)
Dept: HOSPITAL 75 - ER | Age: 68
Discharge: HOME | End: 2023-07-10
Payer: MEDICARE

## 2023-07-10 VITALS — SYSTOLIC BLOOD PRESSURE: 121 MMHG | DIASTOLIC BLOOD PRESSURE: 87 MMHG

## 2023-07-10 DIAGNOSIS — Z88.6: ICD-10-CM

## 2023-07-10 DIAGNOSIS — M54.16: ICD-10-CM

## 2023-07-10 DIAGNOSIS — M25.551: Primary | ICD-10-CM

## 2023-07-10 DIAGNOSIS — Z88.5: ICD-10-CM

## 2023-07-10 PROCEDURE — 73502 X-RAY EXAM HIP UNI 2-3 VIEWS: CPT

## 2023-07-10 NOTE — ED GENERAL
General


Chief Complaint:  Hip/Pelvic Problems


Stated Complaint:  RT HIP AND RT LEG PAIN


Nursing Triage Note:  


PT TO RM 8 BY WC WITH C/O R HIP AND R LEG PAIN THAT STARTED SATURDAY. PT DENIES 


INJURY


Source of Information:  Patient


Exam Limitations:  No Limitations





History of Present Illness


Date Seen by Provider:  Jul 10, 2023


Time Seen by Provider:  09:27


Initial Comments


This 68-year-old gentleman presents to the emergency room with complaints of 

pain in the right hip and buttock region radiating down to the right shin.  Pain

is constant and worse with weightbearing.  He additionally experiences some 

shooting pain and numbness with ambulation.  He denies any prior episodes, but 

reports sciatic nerve problems on the left.  He denies any recent or remote 

injuries.  He has had prior lower lumbar surgery for spinal stenosis.  Dr. Vieira at [place] is his neurosurgeon.  He has been taking ibuprofen without 

much benefit.  His last dose was yesterday evening.  He also used liquid THC and

a vape last night.  He ran out of his muscle relaxers, tizanidine, on Friday.





Allergies and Home Medications


Allergies


Coded Allergies:  


     Penicillins (Verified  Allergy, Unknown, 7/10/23)


     morphine (Verified  Allergy, Unknown, HIVES, 22)


     propoxyphene (Verified  Allergy, Unknown, 5/10/22)





Patient Home Medication List


Home Medication List Reviewed:  Yes


Ciprofloxacin (Cipro) 500 Mg/5 Ml [facility]..rec, 500 MG PO BID


   Prescribed by: RUSLAN LOMBARDI on 22 1203


Doxycycline Hyclate (Doxycycline Hyclate) 100 Mg Tablet, 100 MG PO BID, 

(Reported)


   Entered as Reported by: LILIANE BELL on 5/10/22 1034


Metformin HCl (Metformin HCl) 500 Mg Tablet, 500 MG PO BID, (Reported)


   Entered as Reported by: LILIANE BELL on 5/10/22 1034


Multivitamin (Multivitamin) 1 Each Tablet, 1 EACH PO DAILY, (Reported)


   Entered as Reported by: LILIANE BELL on 5/10/22 1034


Oxycodone HCl (Oxycodone HCl) 5 Mg Tablet, 5-10 MG PO Q6H PRN for PAIN-SEVERE 

(8-10), (Reported)


   Entered as Reported by: LILIANE BELL on 5/10/22 1034


Oxycodone HCl/Acetaminophen (Percocet 5-325 mg Tablet) 1 Each Tablet, 1 TAB PO 

Q4H PRN for PAIN-MODERATE (5-7)


   Prescribed by: ELZBIETA WEN on 7/10/23 1219


Tizanidine HCl (Tizanidine HCl) 2 Mg Tablet, 2 MG PO HS PRN for SPASMS


   Prescribed by: ELZBIETA WEN on 7/10/23 1218





Review of Systems


Review of Systems


Constitutional:  no symptoms reported


EENTM:  no symptoms reported


Respiratory:  no symptoms reported


Cardiovascular:  no symptoms reported


Gastrointestinal:  no symptoms reported


Genitourinary:  no symptoms reported


Musculoskeletal:  see HPI


Skin:  no symptoms reported


Psychiatric/Neurological:  See HPI


Hematologic/Lymphatic:  No Symptoms Reported


Immunological/Allergic:  no symptoms reported





Past Medical-Social-Family Hx


Patient Social History


Tobacco Use?:  No


Use of E-Cig and/or Vaping dev:  No


Substance use?:  Yes


Substance type:  Marijuana


Alcohol Use?:  No


Pt feels they are or have been:  No





Seasonal Allergies


Seasonal Allergies:  No





Past Medical History


Surgery/Hospitalization HX:  


DM





BILAT SHOULDERS, BILAT KNEE REPLACEMENTS, BACK SURGERY, WRIST SURGERY


Surgeries:  Yes


Orthopedic


Respiratory:  No


Cardiac:  No


Neurological:  No


Genitourinary:  No


Gastrointestinal:  No


Musculoskeletal:  Yes (ROTATOR CUFF REPLAIR & AC JOINT. BILAT KNEE REPLACEMENT )


Endocrine:  Yes


Diabetes, Non-Insulin dep


HEENT:  No


Cancer:  No


Psychosocial:  No


Integumentary:  No





Physical Exam


Vital Signs





Vital Signs - First Documented








 7/10/23





 09:03


 


Temp 36.9


 


Pulse 64


 


Resp 17


 


B/P (MAP) 120/90 (100)


 


Pulse Ox 97


 


O2 Delivery Room Air





Capillary Refill :


Height, Weight, BMI


Height: '"


Weight: lbs. oz. kg; 34.93 BMI


Method:


General Appearance:  No Apparent Distress, WD/WN


HEENT:  Normal ENT Inspection


Neck:  Normal Inspection


Respiratory:  Lungs Clear, Normal Breath Sounds


Cardiovascular:  Regular Rate, Rhythm, No Murmur


Back:  Normal Inspection, No Vertebral Tenderness


Extremity:  Normal Inspection, Other (mild TTP near the superior aspect of the 

right buttocks.  No significant pain with palpation or rotation of the hip 

joint.)


Neurologic/Psychiatric:  Alert, Oriented x3, No Motor/Sensory Deficits, Normal 

Mood/Affect


Skin:  Normal Color, Warm/Dry





Progress/Results/Core Measures


Suspected Sepsis


SIRS


Temperature: 


Pulse: 64 


Respiratory Rate: 17


 


Blood Pressure 120 /90 


Mean: 100


 








Results/Orders


Lab Results





My Orders





Orders - ELZBIETA BURCH MD


Ed Iv/Invasive Line Start (7/10/23 09:57)


Ketorolac Injection (Toradol Injection) (7/10/23 10:00)


Orphenadrine Inj (Ed Only) (Norflex Inje (7/10/23 10:00)


Tizanidine Tablet (Zanaflex Tablet) (7/10/23 10:15)


Fentanyl  Inj (Sublimaze Injection) (7/10/23 11:00)


Oxycodone/Apap 5/325mg Tablet (Percocet (7/10/23 11:00)


Hip, Right, 2 Views (7/10/23 11:00)





Medications Given in ED





Vital Signs/I&O











 7/10/23 7/10/23





 09:03 12:20


 


Temp 36.9 36.9


 


Pulse 64 62


 


Resp 17 17


 


B/P (MAP) 120/90 (100) 121/87


 


Pulse Ox 97 97


 


O2 Delivery Room Air Room Air





Capillary Refill :








Blood Pressure Mean:                    100








Progress Note :  


Progress Note


Patient was interviewed and examined.  IV was established to dose medications.  

IV was used so that medications could be promptly redosed if needed.  Labs could

not be obtained from the IV and had initially been ordered to ensure electrol

ytes and renal function were appropriate.  Patient was initially treated with 

Toradol.  Norflex was ordered but was on backorder and not available.  

Tizanidine was given as a substitute.  Patient normally takes tizanidine at home

but was out.  These medications did not provide him with sufficient relief.  

Additional treatment with fentanyl and Percocet was offered.  Patient was alexandra

ewhat argumentative about his pain control options.  I offered several different

options, none of which were satisfactory to him.  He was under the impression 

that he could not take Tylenol with diabetes and that Tylenol in any amount 

would cause rapid injury to his internal organs in the context of diabetes.  

This is why he listed Tylenol as an allergy.  He has not ever actually had a 

true allergic reaction to Tylenol.  Tylenol was removed from his allergy list.  

I explained the nature of Tylenol and its safety when used properly.  Patient 

then eventually did agree to treatment with Percocet.  X-ray imaging was ordered

and obtained.  Radiologist report was reviewed.  There were no significant 

abnormalities in the hip joint.  I suspect his symptoms are more radicular in 

nature.  I offered gabapentin or other neuropathic pain medications which she 

emphatically declined stating he was certain they would not work for him 

although he has never tried them personally.  Ultimately, patient was discharged

with improvement in pain and prescriptions listed below.





Diagnostic Imaging





   Diagonstic Imaging:  Xray


   Plain Films/CT/US/NM/MRI:  hip


Comments


NAME:   YESENIA BHAKTA


[facility] REC#:   [id number]


ACCOUNT#:   [id number]


PT STATUS:   DEP ER


:   1955


PHYSICIAN:   ELZBIETA BURCH MD


ADMIT DATE:   07/10/23/ER


                                  ***Signed***


Date of Exam:07/10/23





HIP, RIGHT, 2 VIEWS








INDICATION: Hip pain.





TECHNIQUE: Two views were obtained.





FINDINGS: The alignment is normal. There is no fracture or


dislocation. Soft tissues are unremarkable.





IMPRESSION: No acute fracture or dislocation.





Dictated by: 





  Dictated on workstation # [id number]








Dict:   07/10/23 1159


Trans:   07/10/23 1640


[id number] [id number]





Interpreted by:     ANA MARIA SHEIKH MD


Electronically signed by: ANA MARIA SHEIKH MD 07/10/23 1640





Departure


Impression





   Primary Impression:  


   Left hip pain


   Additional Impression:  


   Lumbar radiculopathy


Disposition:  01 HOME, SELF-CARE


Condition:  Improved





Departure-Patient Inst.


Decision time for Depature:  12:14


Referrals:  


ESTRELLITA RAVI MD (PCP/Family)


Primary Care Physician


Patient Instructions:  Radiculopathy





Add. Discharge Instructions:  


Your pain is likely coming from an issue in your lower back or one of the nerve 

roots exiting the lower back.


Use tizanidine (muscle relaxer) and Percocet (oxycodone/acetaminophen) as 

prescribed.  These medications may cause drowsiness, so use with caution.  Do 

not drive, make important decisions, or operate machinery while taking these 

medications.  These medications may also cause constipation, so you may wish to 

use a stool softener such as Colace while on these medications.  Drink plenty of

clear liquids to stay well-hydrated.


Follow-up with your primary care provider soon as possible and seek referral 

back to your spine surgeon.


Return to the emergency room if you have worsening symptoms.  In particular, 

return to the emergency room promptly if you develop problems controlling bowel 

or bladder, weakness in your legs, or numbness in your groin.





All discharge instructions reviewed with patient and/or family. Voiced 

understanding.


Scripts


Oxycodone HCl/Acetaminophen (Percocet 5-325 mg Tablet) 1 Each Tablet


1 TAB PO Q4H PRN for PAIN-MODERATE (5-7) MDD 6 TABS, #10 TAB


   Prov: ELZBIETA BURCH MD         7/10/23 


Tizanidine HCl (Tizanidine HCl) 2 Mg Tablet


2 MG PO HS PRN for SPASMS, #10 TAB


   Prov: ELZBIETA BURCH MD         7/10/23





Copy


Copies To 1:   ESTRELLITA RAVI MD, JOSHUA T MD        Jul 10, 2023 10:38

## 2023-07-10 NOTE — DIAGNOSTIC IMAGING REPORT
INDICATION: Hip pain.



TECHNIQUE: Two views were obtained.



FINDINGS: The alignment is normal. There is no fracture or

dislocation. Soft tissues are unremarkable.



IMPRESSION: No acute fracture or dislocation.



Dictated by: 



  Dictated on workstation # [id number]

## 2023-08-14 ENCOUNTER — HOSPITAL ENCOUNTER (OUTPATIENT)
Dept: HOSPITAL 75 - LAB | Age: 68
End: 2023-08-14
Attending: FAMILY MEDICINE
Payer: MEDICARE

## 2023-08-14 DIAGNOSIS — E11.65: Primary | ICD-10-CM

## 2023-08-14 LAB
ALBUMIN SERPL-MCNC: 4.2 GM/DL (ref 3.2–4.5)
ALP SERPL-CCNC: 63 U/L (ref 40–136)
ALT SERPL-CCNC: 44 U/L (ref 0–55)
BASOPHILS # BLD AUTO: 0 10^3/UL (ref 0–0.1)
BASOPHILS NFR BLD AUTO: 0 % (ref 0–10)
BILIRUB SERPL-MCNC: 1.9 MG/DL (ref 0.1–1)
BUN/CREAT SERPL: 15
CALCIUM SERPL-MCNC: 9.4 MG/DL (ref 8.5–10.1)
CHLORIDE SERPL-SCNC: 107 MMOL/L (ref 98–107)
CHOLEST SERPL-MCNC: 119 MG/DL (ref ?–200)
CO2 SERPL-SCNC: 21 MMOL/L (ref 21–32)
CREAT SERPL-MCNC: 1.05 MG/DL (ref 0.6–1.3)
EOSINOPHIL # BLD AUTO: 0.2 10^3/UL (ref 0–0.3)
EOSINOPHIL NFR BLD AUTO: 2 % (ref 0–10)
GFR SERPLBLD BASED ON 1.73 SQ M-ARVRAT: 78 ML/MIN
GLUCOSE SERPL-MCNC: 123 MG/DL (ref 70–105)
HCT VFR BLD CALC: 43 % (ref 40–54)
HDLC SERPL-MCNC: 30 MG/DL (ref 40–60)
HGB BLD-MCNC: 14.4 G/DL (ref 13.3–17.7)
LYMPHOCYTES # BLD AUTO: 2 10^3/UL (ref 1–4)
LYMPHOCYTES NFR BLD AUTO: 26 % (ref 12–44)
MANUAL DIFFERENTIAL PERFORMED BLD QL: NO
MCH RBC QN AUTO: 30 PG (ref 25–34)
MCHC RBC AUTO-ENTMCNC: 34 G/DL (ref 32–36)
MCV RBC AUTO: 89 FL (ref 80–99)
MONOCYTES # BLD AUTO: 0.5 10^3/UL (ref 0–1)
MONOCYTES NFR BLD AUTO: 7 % (ref 0–12)
NEUTROPHILS # BLD AUTO: 5 10^3/UL (ref 1.8–7.8)
NEUTROPHILS NFR BLD AUTO: 64 % (ref 42–75)
PLATELET # BLD: 245 10^3/UL (ref 130–400)
PMV BLD AUTO: 11 FL (ref 9–12.2)
POTASSIUM SERPL-SCNC: 4 MMOL/L (ref 3.6–5)
PROT SERPL-MCNC: 7.6 GM/DL (ref 6.4–8.2)
SODIUM SERPL-SCNC: 139 MMOL/L (ref 135–145)
TRIGL SERPL-MCNC: 79 MG/DL (ref ?–150)
VLDLC SERPL CALC-MCNC: 16 MG/DL (ref 5–40)
WBC # BLD AUTO: 7.8 10^3/UL (ref 4.3–11)

## 2023-08-14 PROCEDURE — 85025 COMPLETE CBC W/AUTO DIFF WBC: CPT

## 2023-08-14 PROCEDURE — 36415 COLL VENOUS BLD VENIPUNCTURE: CPT

## 2023-08-14 PROCEDURE — 80053 COMPREHEN METABOLIC PANEL: CPT

## 2023-08-14 PROCEDURE — 80061 LIPID PANEL: CPT

## 2023-08-14 PROCEDURE — 83036 HEMOGLOBIN GLYCOSYLATED A1C: CPT

## 2023-11-20 ENCOUNTER — HOSPITAL ENCOUNTER (OUTPATIENT)
Dept: HOSPITAL 75 - LAB | Age: 68
End: 2023-11-20
Attending: FAMILY MEDICINE
Payer: MEDICARE

## 2023-11-20 DIAGNOSIS — E78.00: ICD-10-CM

## 2023-11-20 DIAGNOSIS — I10: ICD-10-CM

## 2023-11-20 DIAGNOSIS — E11.65: Primary | ICD-10-CM

## 2023-11-20 DIAGNOSIS — E55.9: ICD-10-CM

## 2023-11-20 LAB
ALBUMIN SERPL-MCNC: 4.4 GM/DL (ref 3.2–4.5)
ALP SERPL-CCNC: 64 U/L (ref 40–136)
ALT SERPL-CCNC: 33 U/L (ref 0–55)
BASOPHILS # BLD AUTO: 0 10^3/UL (ref 0–0.1)
BASOPHILS NFR BLD AUTO: 0 % (ref 0–10)
BILIRUB SERPL-MCNC: 1.9 MG/DL (ref 0.1–1)
BUN/CREAT SERPL: 18
CALCIUM SERPL-MCNC: 9.6 MG/DL (ref 8.5–10.1)
CHLORIDE SERPL-SCNC: 106 MMOL/L (ref 98–107)
CHOLEST SERPL-MCNC: 141 MG/DL (ref ?–200)
CO2 SERPL-SCNC: 22 MMOL/L (ref 21–32)
CREAT SERPL-MCNC: 1.08 MG/DL (ref 0.6–1.3)
EOSINOPHIL # BLD AUTO: 0.1 10^3/UL (ref 0–0.3)
EOSINOPHIL NFR BLD AUTO: 2 % (ref 0–10)
GFR SERPLBLD BASED ON 1.73 SQ M-ARVRAT: 76 ML/MIN
GLUCOSE SERPL-MCNC: 119 MG/DL (ref 70–105)
HCT VFR BLD CALC: 45 % (ref 40–54)
HDLC SERPL-MCNC: 36 MG/DL (ref 40–60)
HGB BLD-MCNC: 14.9 G/DL (ref 13.3–17.7)
LYMPHOCYTES # BLD AUTO: 2.1 10^3/UL (ref 1–4)
LYMPHOCYTES NFR BLD AUTO: 22 % (ref 12–44)
MANUAL DIFFERENTIAL PERFORMED BLD QL: NO
MCH RBC QN AUTO: 30 PG (ref 25–34)
MCHC RBC AUTO-ENTMCNC: 33 G/DL (ref 32–36)
MCV RBC AUTO: 90 FL (ref 80–99)
MONOCYTES # BLD AUTO: 0.6 10^3/UL (ref 0–1)
MONOCYTES NFR BLD AUTO: 7 % (ref 0–12)
NEUTROPHILS # BLD AUTO: 6.4 10^3/UL (ref 1.8–7.8)
NEUTROPHILS NFR BLD AUTO: 69 % (ref 42–75)
PLATELET # BLD: 257 10^3/UL (ref 130–400)
PMV BLD AUTO: 11.1 FL (ref 9–12.2)
POTASSIUM SERPL-SCNC: 4.2 MMOL/L (ref 3.6–5)
PROT SERPL-MCNC: 8 GM/DL (ref 6.4–8.2)
SODIUM SERPL-SCNC: 138 MMOL/L (ref 135–145)
TRIGL SERPL-MCNC: 140 MG/DL (ref ?–150)
VLDLC SERPL CALC-MCNC: 28 MG/DL (ref 5–40)
WBC # BLD AUTO: 9.3 10^3/UL (ref 4.3–11)

## 2023-11-20 PROCEDURE — 83036 HEMOGLOBIN GLYCOSYLATED A1C: CPT

## 2023-11-20 PROCEDURE — 82043 UR ALBUMIN QUANTITATIVE: CPT

## 2023-11-20 PROCEDURE — 36415 COLL VENOUS BLD VENIPUNCTURE: CPT

## 2023-11-20 PROCEDURE — 85025 COMPLETE CBC W/AUTO DIFF WBC: CPT

## 2023-11-20 PROCEDURE — 80053 COMPREHEN METABOLIC PANEL: CPT

## 2023-11-20 PROCEDURE — 80061 LIPID PANEL: CPT

## 2023-11-20 PROCEDURE — 82306 VITAMIN D 25 HYDROXY: CPT
